# Patient Record
Sex: MALE | Race: WHITE | NOT HISPANIC OR LATINO | URBAN - METROPOLITAN AREA
[De-identification: names, ages, dates, MRNs, and addresses within clinical notes are randomized per-mention and may not be internally consistent; named-entity substitution may affect disease eponyms.]

---

## 2017-03-21 ENCOUNTER — OFFICE (OUTPATIENT)
Dept: URBAN - METROPOLITAN AREA CLINIC 14 | Facility: CLINIC | Age: 82
End: 2017-03-21
Payer: COMMERCIAL

## 2017-03-21 VITALS
DIASTOLIC BLOOD PRESSURE: 68 MMHG | HEART RATE: 60 BPM | HEIGHT: 68 IN | WEIGHT: 160 LBS | SYSTOLIC BLOOD PRESSURE: 138 MMHG

## 2017-03-21 DIAGNOSIS — K59.00 CONSTIPATION, UNSPECIFIED: ICD-10-CM

## 2017-03-21 DIAGNOSIS — R14.0 ABDOMINAL DISTENSION (GASEOUS): ICD-10-CM

## 2017-03-21 DIAGNOSIS — K64.8 OTHER HEMORRHOIDS: ICD-10-CM

## 2017-03-21 PROCEDURE — 99213 OFFICE O/P EST LOW 20 MIN: CPT | Performed by: INTERNAL MEDICINE

## 2017-03-21 PROCEDURE — G8427 DOCREV CUR MEDS BY ELIG CLIN: HCPCS | Performed by: INTERNAL MEDICINE

## 2017-03-21 RX ORDER — HYDROCORTISONE ACETATE 25 MG/1
SUPPOSITORY RECTAL
Qty: 20 | Refills: 3 | Status: ACTIVE
Start: 2016-02-01

## 2018-07-09 ENCOUNTER — OFFICE (OUTPATIENT)
Dept: URBAN - METROPOLITAN AREA CLINIC 14 | Facility: CLINIC | Age: 83
End: 2018-07-09
Payer: COMMERCIAL

## 2018-07-09 VITALS
DIASTOLIC BLOOD PRESSURE: 71 MMHG | HEIGHT: 68 IN | WEIGHT: 160 LBS | HEART RATE: 80 BPM | SYSTOLIC BLOOD PRESSURE: 147 MMHG

## 2018-07-09 DIAGNOSIS — K59.00 CONSTIPATION, UNSPECIFIED: ICD-10-CM

## 2018-07-09 DIAGNOSIS — R14.0 ABDOMINAL DISTENSION (GASEOUS): ICD-10-CM

## 2018-07-09 DIAGNOSIS — K64.8 OTHER HEMORRHOIDS: ICD-10-CM

## 2018-07-09 PROCEDURE — 99213 OFFICE O/P EST LOW 20 MIN: CPT | Performed by: INTERNAL MEDICINE

## 2018-07-09 RX ORDER — CIPROFLOXACIN 250 MG/1
TABLET, FILM COATED ORAL
Qty: 20 | Refills: 0 | Status: COMPLETED
Start: 2018-07-09 | End: 2018-08-30

## 2018-07-09 RX ORDER — HYDROCORTISONE ACETATE 25 MG/1
SUPPOSITORY RECTAL
Qty: 20 | Refills: 3 | Status: ACTIVE
Start: 2016-02-01

## 2018-08-30 ENCOUNTER — OFFICE (OUTPATIENT)
Dept: URBAN - METROPOLITAN AREA CLINIC 14 | Facility: CLINIC | Age: 83
End: 2018-08-30
Payer: COMMERCIAL

## 2018-08-30 VITALS
DIASTOLIC BLOOD PRESSURE: 68 MMHG | RESPIRATION RATE: 16 BRPM | WEIGHT: 158 LBS | SYSTOLIC BLOOD PRESSURE: 131 MMHG | HEART RATE: 68 BPM | HEIGHT: 68 IN

## 2018-08-30 DIAGNOSIS — R14.0 ABDOMINAL DISTENSION (GASEOUS): ICD-10-CM

## 2018-08-30 DIAGNOSIS — I10 ESSENTIAL (PRIMARY) HYPERTENSION: ICD-10-CM

## 2018-08-30 DIAGNOSIS — R10.84 GENERALIZED ABDOMINAL PAIN: ICD-10-CM

## 2018-08-30 DIAGNOSIS — K59.00 CONSTIPATION, UNSPECIFIED: ICD-10-CM

## 2018-08-30 PROCEDURE — 99214 OFFICE O/P EST MOD 30 MIN: CPT | Performed by: NURSE PRACTITIONER

## 2018-08-30 RX ORDER — ESOMEPRAZOLE MAGNESIUM 20 MG/1
22.3 CAPSULE, DELAYED RELEASE ORAL
Qty: 30 | Refills: 0 | Status: ACTIVE
Start: 2018-08-30

## 2018-08-30 NOTE — SERVICEHPINOTES
Efrem Almendarez   is a   89   year old  male   here todayWith complaints of abdominal pain.  The patient notes for the last 3 days he has had significant generalized abdominal pain whenever he eats any solid food.  He has been able to tolerate mashed potatoes and scrambled eggs but does experience some pain with the eggs.  He is not aware of anything that may have triggered his symptoms and denies any associated nausea, vomiting or fever.  He did have similar issues many years ago and this was managed with a brief alteration in his diet.  He complains of significant gaseousness but is having very regular stools with taking about a 3rd dose of MiraLax per day.  He has at least 2-3 stools per day and denies any melena or hematochezia.  His  previous bloating symptom improved some with taking a course of Cipro.

## 2018-10-24 ENCOUNTER — TELEPHONE (OUTPATIENT)
Dept: FAMILY MEDICINE | Facility: CLINIC | Age: 83
End: 2018-10-24

## 2018-10-24 NOTE — TELEPHONE ENCOUNTER
Pt daughter in law wanted pt medical records faxed over to us from outside source. ANA will be faxed over to other sources during pt visit.

## 2018-10-24 NOTE — TELEPHONE ENCOUNTER
----- Message from Heaven Mayo sent at 10/24/2018  2:35 PM CDT -----  Contact: Patients daughter in law, Adam Medina needs to speak to nurse regarding getting patients medical record, she already spoke to medical records and they told her to speak to nurse, please call her back at [phone]. Thank you

## 2018-10-29 ENCOUNTER — TELEPHONE (OUTPATIENT)
Dept: FAMILY MEDICINE | Facility: CLINIC | Age: 83
End: 2018-10-29

## 2018-10-29 NOTE — TELEPHONE ENCOUNTER
----- Message from Shannon Miranda sent at 10/29/2018 12:13 PM CDT -----  Contact: pt daughter - Ms Gallagher  Pt stated she calling about a request for pt medical records, she can be reached at [phone] Thanks

## 2018-11-19 ENCOUNTER — OFFICE VISIT (OUTPATIENT)
Dept: FAMILY MEDICINE | Facility: CLINIC | Age: 83
End: 2018-11-19
Payer: MEDICARE

## 2018-11-19 ENCOUNTER — LAB VISIT (OUTPATIENT)
Dept: LAB | Facility: HOSPITAL | Age: 83
End: 2018-11-19
Payer: MEDICARE

## 2018-11-19 VITALS
DIASTOLIC BLOOD PRESSURE: 70 MMHG | HEART RATE: 82 BPM | OXYGEN SATURATION: 97 % | HEIGHT: 69 IN | WEIGHT: 156.63 LBS | BODY MASS INDEX: 23.2 KG/M2 | SYSTOLIC BLOOD PRESSURE: 128 MMHG | RESPIRATION RATE: 16 BRPM | TEMPERATURE: 98 F

## 2018-11-19 DIAGNOSIS — F41.9 ANXIETY: ICD-10-CM

## 2018-11-19 DIAGNOSIS — F32.A DEPRESSION, UNSPECIFIED DEPRESSION TYPE: ICD-10-CM

## 2018-11-19 DIAGNOSIS — I10 ESSENTIAL HYPERTENSION: ICD-10-CM

## 2018-11-19 DIAGNOSIS — I25.10 CORONARY ARTERY DISEASE, ANGINA PRESENCE UNSPECIFIED, UNSPECIFIED VESSEL OR LESION TYPE, UNSPECIFIED WHETHER NATIVE OR TRANSPLANTED HEART: ICD-10-CM

## 2018-11-19 DIAGNOSIS — Z95.1 HX OF CABG: ICD-10-CM

## 2018-11-19 DIAGNOSIS — E78.5 DYSLIPIDEMIA: ICD-10-CM

## 2018-11-19 DIAGNOSIS — N40.0 BENIGN PROSTATIC HYPERPLASIA WITHOUT LOWER URINARY TRACT SYMPTOMS: ICD-10-CM

## 2018-11-19 DIAGNOSIS — R25.1 TREMOR: ICD-10-CM

## 2018-11-19 LAB
ALBUMIN SERPL BCP-MCNC: 3.4 G/DL
ALP SERPL-CCNC: 60 U/L
ALT SERPL W/O P-5'-P-CCNC: 13 U/L
ANION GAP SERPL CALC-SCNC: 6 MMOL/L
AST SERPL-CCNC: 20 U/L
BASOPHILS # BLD AUTO: 0.04 K/UL
BASOPHILS NFR BLD: 0.7 %
BILIRUB SERPL-MCNC: 0.5 MG/DL
BUN SERPL-MCNC: 17 MG/DL
CALCIUM SERPL-MCNC: 10.3 MG/DL
CHLORIDE SERPL-SCNC: 104 MMOL/L
CHOLEST SERPL-MCNC: 181 MG/DL
CHOLEST/HDLC SERPL: 4.8 {RATIO}
CO2 SERPL-SCNC: 29 MMOL/L
CREAT SERPL-MCNC: 0.8 MG/DL
DIFFERENTIAL METHOD: ABNORMAL
EOSINOPHIL # BLD AUTO: 0.3 K/UL
EOSINOPHIL NFR BLD: 4.5 %
ERYTHROCYTE [DISTWIDTH] IN BLOOD BY AUTOMATED COUNT: 12.6 %
EST. GFR  (AFRICAN AMERICAN): >60 ML/MIN/1.73 M^2
EST. GFR  (NON AFRICAN AMERICAN): >60 ML/MIN/1.73 M^2
GLUCOSE SERPL-MCNC: 87 MG/DL
HCT VFR BLD AUTO: 38.9 %
HDLC SERPL-MCNC: 38 MG/DL
HDLC SERPL: 21 %
HGB BLD-MCNC: 12.6 G/DL
IMM GRANULOCYTES # BLD AUTO: 0.01 K/UL
IMM GRANULOCYTES NFR BLD AUTO: 0.2 %
LDLC SERPL CALC-MCNC: 110.6 MG/DL
LYMPHOCYTES # BLD AUTO: 1.6 K/UL
LYMPHOCYTES NFR BLD: 28.2 %
MCH RBC QN AUTO: 33.9 PG
MCHC RBC AUTO-ENTMCNC: 32.4 G/DL
MCV RBC AUTO: 105 FL
MONOCYTES # BLD AUTO: 0.6 K/UL
MONOCYTES NFR BLD: 10.9 %
NEUTROPHILS # BLD AUTO: 3.2 K/UL
NEUTROPHILS NFR BLD: 55.5 %
NONHDLC SERPL-MCNC: 143 MG/DL
NRBC BLD-RTO: 0 /100 WBC
PLATELET # BLD AUTO: 138 K/UL
PMV BLD AUTO: 10.5 FL
POTASSIUM SERPL-SCNC: 4.2 MMOL/L
PROT SERPL-MCNC: 5.6 G/DL
RBC # BLD AUTO: 3.72 M/UL
SODIUM SERPL-SCNC: 139 MMOL/L
TRIGL SERPL-MCNC: 162 MG/DL
WBC # BLD AUTO: 5.77 K/UL

## 2018-11-19 PROCEDURE — 85025 COMPLETE CBC W/AUTO DIFF WBC: CPT

## 2018-11-19 PROCEDURE — 99205 OFFICE O/P NEW HI 60 MIN: CPT | Mod: S$PBB,,, | Performed by: INTERNAL MEDICINE

## 2018-11-19 PROCEDURE — 80061 LIPID PANEL: CPT

## 2018-11-19 PROCEDURE — 36415 COLL VENOUS BLD VENIPUNCTURE: CPT | Mod: PO

## 2018-11-19 PROCEDURE — 99999 PR PBB SHADOW E&M-EST. PATIENT-LVL IV: CPT | Mod: PBBFAC,,, | Performed by: INTERNAL MEDICINE

## 2018-11-19 PROCEDURE — 80053 COMPREHEN METABOLIC PANEL: CPT

## 2018-11-19 PROCEDURE — 99214 OFFICE O/P EST MOD 30 MIN: CPT | Mod: PBBFAC,PO | Performed by: INTERNAL MEDICINE

## 2018-11-19 RX ORDER — ALPRAZOLAM 0.5 MG/1
0.5 TABLET ORAL DAILY
COMMUNITY
End: 2019-05-24

## 2018-11-19 RX ORDER — TAMSULOSIN HYDROCHLORIDE 0.4 MG/1
0.4 CAPSULE ORAL DAILY
COMMUNITY

## 2018-11-19 RX ORDER — POLYETHYLENE GLYCOL 3350 17 G/17G
17 POWDER, FOR SOLUTION ORAL DAILY
COMMUNITY

## 2018-11-19 RX ORDER — FINASTERIDE 5 MG/1
5 TABLET, FILM COATED ORAL DAILY
COMMUNITY
End: 2018-11-28 | Stop reason: SDUPTHER

## 2018-11-19 RX ORDER — ASCORBIC ACID 125 MG
TABLET,CHEWABLE ORAL
COMMUNITY

## 2018-11-19 RX ORDER — FLUTICASONE PROPIONATE 50 MCG
2 SPRAY, SUSPENSION (ML) NASAL DAILY
COMMUNITY

## 2018-11-19 RX ORDER — PRAVASTATIN SODIUM 40 MG/1
40 TABLET ORAL DAILY
Qty: 90 TABLET | Refills: 3 | Status: SHIPPED | OUTPATIENT
Start: 2018-11-19 | End: 2019-11-19

## 2018-11-19 RX ORDER — PAROXETINE 10 MG/1
10 TABLET, FILM COATED ORAL EVERY MORNING
Qty: 90 TABLET | Refills: 3 | Status: SHIPPED | OUTPATIENT
Start: 2018-11-19 | End: 2019-11-19

## 2018-11-19 NOTE — PROGRESS NOTES
Subjective:       Patient ID: Fei Vicente is a 89 y.o. male.    Chief Complaint: Establish Care; Benign Prostatic Hypertrophy; Coronary Artery Disease; Anxiety; Hypertension; and Hyperlipidemia    --est care------------      Benign Prostatic Hypertrophy   This is a chronic problem. Irritative symptoms do not include frequency or urgency. Pertinent negatives include no chills, dysuria, hematuria, nausea or vomiting.   Coronary Artery Disease   Presents for follow-up visit. Pertinent negatives include no chest pain, chest tightness, dizziness, leg swelling, palpitations or shortness of breath. Risk factors include hyperlipidemia. The symptoms have been stable.   Anxiety   Patient reports no chest pain, confusion, decreased concentration, dizziness, nausea, nervous/anxious behavior, palpitations, shortness of breath or suicidal ideas.     His past medical history is significant for CAD.   Hypertension   The problem is controlled. Associated symptoms include anxiety. Pertinent negatives include no chest pain, headaches, neck pain, palpitations or shortness of breath.   Hyperlipidemia   Associated symptoms include myalgias. Pertinent negatives include no chest pain or shortness of breath. Current antihyperlipidemic treatment includes statins.     No past medical history on file.  No past surgical history on file.  No family history on file.  Social History     Socioeconomic History    Marital status: Single     Spouse name: Not on file    Number of children: Not on file    Years of education: Not on file    Highest education level: Not on file   Social Needs    Financial resource strain: Not on file    Food insecurity - worry: Not on file    Food insecurity - inability: Not on file    Transportation needs - medical: Not on file    Transportation needs - non-medical: Not on file   Occupational History    Not on file   Tobacco Use    Smoking status: Never Smoker    Smokeless tobacco: Never Used   Substance and  Sexual Activity    Alcohol use: Not on file    Drug use: Not on file    Sexual activity: Not on file   Other Topics Concern    Not on file   Social History Narrative    Not on file     Review of Systems   Constitutional: Negative for activity change, appetite change, chills, diaphoresis, fatigue, fever and unexpected weight change.   HENT: Negative for drooling, ear discharge, ear pain, facial swelling, hearing loss, mouth sores, nosebleeds, postnasal drip, rhinorrhea, sinus pressure, sneezing, sore throat, tinnitus, trouble swallowing and voice change.    Eyes: Positive for visual disturbance. Negative for photophobia and redness.   Respiratory: Negative for apnea, cough, choking, chest tightness, shortness of breath and wheezing.    Cardiovascular: Negative for chest pain, palpitations and leg swelling.   Gastrointestinal: Negative for abdominal distention, abdominal pain, anal bleeding, blood in stool, constipation, diarrhea, nausea and vomiting.   Endocrine: Negative for cold intolerance, heat intolerance, polydipsia, polyphagia and polyuria.   Genitourinary: Negative for difficulty urinating, dysuria, enuresis, flank pain, frequency, genital sores, hematuria and urgency.   Musculoskeletal: Positive for arthralgias, back pain and myalgias. Negative for gait problem, joint swelling, neck pain and neck stiffness.   Skin: Negative for color change, pallor, rash and wound.   Allergic/Immunologic: Negative for food allergies and immunocompromised state.   Neurological: Positive for tremors. Negative for dizziness, seizures, syncope, facial asymmetry, speech difficulty, weakness, light-headedness, numbness and headaches.   Hematological: Negative for adenopathy. Does not bruise/bleed easily.   Psychiatric/Behavioral: Negative for agitation, behavioral problems, confusion, decreased concentration, dysphoric mood, hallucinations, self-injury, sleep disturbance and suicidal ideas. The patient is not nervous/anxious  and is not hyperactive.        Objective:      Physical Exam   Constitutional: He is oriented to person, place, and time. He appears well-developed and well-nourished. No distress.   HENT:   Head: Normocephalic and atraumatic.   Eyes: Pupils are equal, round, and reactive to light.   Neck: Normal range of motion. Neck supple. No JVD present. Carotid bruit is not present. No tracheal deviation present. No thyromegaly present.   Cardiovascular: Normal rate, regular rhythm, normal heart sounds and intact distal pulses.   Pulmonary/Chest: Effort normal and breath sounds normal. No respiratory distress. He has no wheezes. He has no rales. He exhibits no tenderness.   Abdominal: Soft. Bowel sounds are normal. He exhibits no distension. There is no tenderness. There is no rebound and no guarding.   Musculoskeletal: Normal range of motion. He exhibits no edema or tenderness.   Lymphadenopathy:     He has no cervical adenopathy.   Neurological: He is alert and oriented to person, place, and time.   Skin: Skin is warm and dry. No rash noted. He is not diaphoretic. No erythema. No pallor.   Psychiatric: He has a normal mood and affect. His behavior is normal. Judgment and thought content normal.   Nursing note and vitals reviewed.      CMP  No results found for: NA, K, CL, CO2, GLU, BUN, CREATININE, CALCIUM, PROT, ALBUMIN, BILITOT, ALKPHOS, AST, ALT, ANIONGAP, ESTGFRAFRICA, EGFRNONAA  No results found for: WBC, HGB, HCT, MCV, PLT  No results found for: CHOL  No results found for: HDL  No results found for: LDLCALC  No results found for: TRIG  No results found for: CHOLHDL  No results found for: TSH  No results found for: LABA1C, HGBA1C  Assessment:       1. Coronary artery disease, angina presence unspecified, unspecified vessel or lesion type, unspecified whether native or transplanted heart    2. Hx of CABG    3. Dyslipidemia    4. Tremor    5. Anxiety    6. Benign prostatic hyperplasia without lower urinary tract symptoms     7. Depression, unspecified depression type    8. Essential hypertension        Plan:   Coronary artery disease, angina presence unspecified, unspecified vessel or lesion type, unspecified whether native or transplanted heart-stable.    Hx of CABG    Dyslipidemia  -     Lipid panel; Future; Expected date: 11/19/2018  -     pravastatin (PRAVACHOL) 40 MG tablet; Take 1 tablet (40 mg total) by mouth once daily.  Dispense: 90 tablet; Refill: 3    Tremor-stable./    Anxiety  -     paroxetine (PAXIL) 10 MG tablet; Take 1 tablet (10 mg total) by mouth every morning.  Dispense: 90 tablet; Refill: 3    Benign prostatic hyperplasia without lower urinary tract symptoms-stable.    Depression, unspecified depression type  -     paroxetine (PAXIL) 10 MG tablet; Take 1 tablet (10 mg total) by mouth every morning.  Dispense: 90 tablet; Refill: 3    Essential hypertension  -     Comprehensive metabolic panel; Future; Expected date: 11/19/2018  -     CBC auto differential; Future; Expected date: 11/19/2018    Other orders  -     Cancel: Lipid panel; Future; Expected date: 11/05/2018    F/u 6 months.

## 2018-11-20 ENCOUNTER — OFFICE VISIT (OUTPATIENT)
Dept: OPHTHALMOLOGY | Facility: CLINIC | Age: 83
End: 2018-11-20
Payer: MEDICARE

## 2018-11-20 ENCOUNTER — TELEPHONE (OUTPATIENT)
Dept: FAMILY MEDICINE | Facility: CLINIC | Age: 83
End: 2018-11-20

## 2018-11-20 DIAGNOSIS — H35.3231 EXUDATIVE AGE-RELATED MACULAR DEGENERATION OF BOTH EYES WITH ACTIVE CHOROIDAL NEOVASCULARIZATION: Primary | ICD-10-CM

## 2018-11-20 DIAGNOSIS — D64.9 ANEMIA, UNSPECIFIED TYPE: Primary | ICD-10-CM

## 2018-11-20 PROCEDURE — 92134 CPTRZ OPH DX IMG PST SGM RTA: CPT | Mod: PBBFAC,PO | Performed by: OPHTHALMOLOGY

## 2018-11-20 PROCEDURE — 99999 PR PBB SHADOW E&M-EST. PATIENT-LVL II: CPT | Mod: PBBFAC,,, | Performed by: OPHTHALMOLOGY

## 2018-11-20 PROCEDURE — 92004 COMPRE OPH EXAM NEW PT 1/>: CPT | Mod: S$PBB,,, | Performed by: OPHTHALMOLOGY

## 2018-11-20 PROCEDURE — 99212 OFFICE O/P EST SF 10 MIN: CPT | Mod: PBBFAC,PO | Performed by: OPHTHALMOLOGY

## 2018-11-20 NOTE — PROGRESS NOTES
l    ===============================  11/20/2018   Fei Vicente,   89 y.o. male   Last visit [facility]: :Visit date not found   Last visit eye dept. Visit date not found  [facility]:  Corrected distance visual acuity was 20/50 in the right eye and 20/400 in the left eye.  Tonometry     Tonometry (Applanation, 10:46 AM)       Right Left    Pressure 16 11              Wearing Rx     Wearing Rx       Sphere Cylinder Axis Add    Right +0.75 +0.50 175 +3.00    Left +1.00 +0.75 175 +3.00    Type:  PAL               Not recorded        Chief Complaint   Patient presents with    Macular Degeneration     Pt states that he just moved here from [place]/ needs to establish care with retina doctor        HPI     Macular Degeneration      Additional comments: Pt states that he just moved here from [place]/   needs to establish care with retina doctor              Comments     No dm  PCIOL OU  INJECTIONS  OU (DR. MEGAN CHILD)  [facility]          Last edited by Claudia Chin on 11/20/2018 10:36 AM. (History)          ________________  11/20/2018  Problem List Items Addressed This Visit     None        Last inj od   Had been ou  txx  presumtive avstin  20 50  400  Inc cd os >od  Get cct and rnfl next visit  eyela  Ou  6 week interval - prn  rec contiune eylea ou      .       ===============================

## 2018-11-28 RX ORDER — FINASTERIDE 5 MG/1
5 TABLET, FILM COATED ORAL DAILY
Qty: 90 TABLET | Refills: 3 | Status: SHIPPED | OUTPATIENT
Start: 2018-11-28

## 2018-11-28 NOTE — TELEPHONE ENCOUNTER
Pt daughter states during pt last visit you stated once the pt medications are out you will be able to refill his medications. Pt last appt with you was to est care.

## 2018-11-28 NOTE — TELEPHONE ENCOUNTER
Steffi, pt daughter in law was informed she will have to resign the consent form with pt and pt son.

## 2018-11-30 ENCOUNTER — PROCEDURE VISIT (OUTPATIENT)
Dept: OPHTHALMOLOGY | Facility: CLINIC | Age: 83
End: 2018-11-30
Payer: MEDICARE

## 2018-11-30 DIAGNOSIS — H35.3231 EXUDATIVE AGE-RELATED MACULAR DEGENERATION OF BOTH EYES WITH ACTIVE CHOROIDAL NEOVASCULARIZATION: Primary | ICD-10-CM

## 2018-11-30 PROCEDURE — 99499 UNLISTED E&M SERVICE: CPT | Mod: S$PBB,,, | Performed by: OPHTHALMOLOGY

## 2018-11-30 PROCEDURE — 67028 INJECTION EYE DRUG: CPT | Mod: PBBFAC,PO,RT,LT | Performed by: OPHTHALMOLOGY

## 2018-11-30 PROCEDURE — 92134 CPTRZ OPH DX IMG PST SGM RTA: CPT | Mod: PBBFAC,PO | Performed by: OPHTHALMOLOGY

## 2018-11-30 PROCEDURE — 67028 INJECTION EYE DRUG: CPT | Mod: S$PBB,50,, | Performed by: OPHTHALMOLOGY

## 2018-11-30 RX ORDER — POLYMYXIN B SULFATE AND TRIMETHOPRIM 1; 10000 MG/ML; [USP'U]/ML
1 SOLUTION OPHTHALMIC 4 TIMES DAILY
Qty: 10 ML | Refills: 0 | Status: SHIPPED | OUTPATIENT
Start: 2018-11-30 | End: 2018-12-30

## 2018-11-30 RX ADMIN — AFLIBERCEPT 2 MG: 40 INJECTION, SOLUTION INTRAVITREAL at 10:11

## 2018-11-30 NOTE — PROGRESS NOTES
===============================  11/30/2018   Fei Vicente Sr.,   89 y.o. male   Last visit [facility]: :11/20/2018   Last visit [facility] dept. 11/20/2018  [facility]:  Corrected distance visual acuity was 20/50 in the right eye and 20/400 in the left eye.   Not recorded         Not recorded         Not recorded        No chief complaint on file.             ________________  11/30/2018  Problem List Items Addressed This Visit        Eye/Vision problems    Exudative age-related macular degeneration of both eyes with active choroidal neovascularization - Primary    Relevant Medications    aflibercept Soln 2 mg (Completed)    aflibercept Soln 2 mg (Completed)    Other Relevant Orders    Posterior Segment OCT Retina-Both eyes (Completed)    Prior Authorization Order        Prev  avstin ijnetions ou  lpnp term   Today trial of eyel ou   rtc 1 mo      11/30/2018  Diagnosis :  Ou srn  Today:   Eylea (afibercept) 2 mg/0.05 ml Intravitreal Injection , OU   Follow up: rtc 1 mo  Do          Instructed to call 24/7 for any worsening of vision. Check Both eyes daily. Gave patient my home phone number.      Procedure  Note:   OU}  Eylea (afibercept) 2 mg/0.05 ml Intravitreal Injection    I have explained the Risks, Benefits and Alternatives of the procedure in detail.  The patient voices understanding and all questions have been answered.  The patient agrees to proceed as discussed.  Xylocaine with Epi 2%  subconj bleb  was used for anesthesia.  Topical betadine was used for antisepsis.  0.05 cc was  injected 3.7 mm from corneal limbus in the inferotemporal quadrant.  Following injection the IOP was less than thirty (<30) by tonopen.  The eye was then thoroughly irrigated with BSS.  Patient tolerated procedure well.  No complications were observed.  The Patient was educated that mild irritation tonight was normal secondary to topical antispsis use.  Pt was advised to call at any time day or night for pain, redness, or any decline in vision. I  gave the patient my home number as well as the clinic on call number. Daily visual checks and Amsler grid testing were reviewed.  polytrim Antibiotic Drops to be used 4 times daily for 4 days  PAUL Delgadillo MD  Procedure ordered: y  Consent: y  Pre auth: y  MAR:y  Opnote: y  Charge capture:y           ===========================

## 2018-12-18 ENCOUNTER — PATIENT MESSAGE (OUTPATIENT)
Dept: FAMILY MEDICINE | Facility: CLINIC | Age: 83
End: 2018-12-18

## 2018-12-18 ENCOUNTER — LAB VISIT (OUTPATIENT)
Dept: LAB | Facility: HOSPITAL | Age: 83
End: 2018-12-18
Attending: INTERNAL MEDICINE
Payer: MEDICARE

## 2018-12-18 ENCOUNTER — TELEPHONE (OUTPATIENT)
Dept: FAMILY MEDICINE | Facility: CLINIC | Age: 83
End: 2018-12-18

## 2018-12-18 DIAGNOSIS — D64.9 ANEMIA, UNSPECIFIED TYPE: Primary | ICD-10-CM

## 2018-12-18 DIAGNOSIS — D64.9 ANEMIA, UNSPECIFIED TYPE: ICD-10-CM

## 2018-12-18 LAB
BASOPHILS # BLD AUTO: 0.02 K/UL
BASOPHILS NFR BLD: 0.3 %
DIFFERENTIAL METHOD: ABNORMAL
EOSINOPHIL # BLD AUTO: 0.3 K/UL
EOSINOPHIL NFR BLD: 4 %
ERYTHROCYTE [DISTWIDTH] IN BLOOD BY AUTOMATED COUNT: 12.4 %
HCT VFR BLD AUTO: 36 %
HGB BLD-MCNC: 11.9 G/DL
IMM GRANULOCYTES # BLD AUTO: 0.01 K/UL
IMM GRANULOCYTES NFR BLD AUTO: 0.2 %
LYMPHOCYTES # BLD AUTO: 1.5 K/UL
LYMPHOCYTES NFR BLD: 24.5 %
MCH RBC QN AUTO: 33.4 PG
MCHC RBC AUTO-ENTMCNC: 33.1 G/DL
MCV RBC AUTO: 101 FL
MONOCYTES # BLD AUTO: 0.7 K/UL
MONOCYTES NFR BLD: 11.9 %
NEUTROPHILS # BLD AUTO: 3.7 K/UL
NEUTROPHILS NFR BLD: 59.1 %
NRBC BLD-RTO: 0 /100 WBC
PLATELET # BLD AUTO: 124 K/UL
PMV BLD AUTO: 10.4 FL
RBC # BLD AUTO: 3.56 M/UL
WBC # BLD AUTO: 6.21 K/UL

## 2018-12-18 PROCEDURE — 85025 COMPLETE CBC W/AUTO DIFF WBC: CPT

## 2018-12-18 PROCEDURE — 36415 COLL VENOUS BLD VENIPUNCTURE: CPT | Mod: PO

## 2018-12-18 RX ORDER — PRIMIDONE 50 MG/1
50 TABLET ORAL DAILY
Qty: 30 TABLET | Refills: 2 | Status: SHIPPED | OUTPATIENT
Start: 2018-12-18 | End: 2019-12-18

## 2018-12-18 RX ORDER — CIPROFLOXACIN 250 MG/1
250 TABLET, FILM COATED ORAL 2 TIMES DAILY
Qty: 14 TABLET | Refills: 0 | Status: SHIPPED | OUTPATIENT
Start: 2018-12-18 | End: 2018-12-28

## 2018-12-18 NOTE — TELEPHONE ENCOUNTER
cipro and primidone sent to pharmacy.      Cbc today showed anemia and I ordered hematology consult-----see phone note.

## 2018-12-20 ENCOUNTER — PATIENT MESSAGE (OUTPATIENT)
Dept: FAMILY MEDICINE | Facility: CLINIC | Age: 83
End: 2018-12-20

## 2018-12-20 DIAGNOSIS — R25.1 TREMOR: Primary | ICD-10-CM

## 2019-01-02 ENCOUNTER — CLINICAL SUPPORT (OUTPATIENT)
Dept: AUDIOLOGY | Facility: CLINIC | Age: 84
End: 2019-01-02
Payer: MEDICARE

## 2019-01-02 DIAGNOSIS — H90.5 HEARING LOSS, SENSORINEURAL, COMBINED TYPES: Primary | ICD-10-CM

## 2019-01-02 PROCEDURE — 92567 TYMPANOMETRY: CPT | Mod: PBBFAC,PO | Performed by: AUDIOLOGIST

## 2019-01-02 PROCEDURE — 92557 COMPREHENSIVE HEARING TEST: CPT | Mod: PBBFAC,PO | Performed by: AUDIOLOGIST

## 2019-01-02 NOTE — PROGRESS NOTES
Fei Jules Goodman was seen 01/02/2019 for an audiological evaluation.  Patient complains of bilateral gradual hearing loss. He wears CICs purchased 8-10 years ago in [place]. He recently moved here to be closer to family.     Results reveal a mild-to-severe sensorineural hearing loss 250-8000 Hz for the right ear, and  mild-to-profound sensorineural hearing loss 250-8000 Hz for the left ear.   Speech Reception Thresholds were  30 dBHL for the right ear and 30 dBHL for the left ear.   Word recognition scores were good for the right ear and good for the left ear.   Tympanograms were Type A, normal for the right ear and Type A, normal for the left ear.    Patient was counseled on the above findings.    Recommendations include:    1.  ENT follow-up as needed  2.  Hearing aid consult (information was given to patient at today's visit)  3.  Wear hearing protective devices around loud noise  4.  Annual audiograms

## 2019-01-03 ENCOUNTER — PROCEDURE VISIT (OUTPATIENT)
Dept: OPHTHALMOLOGY | Facility: CLINIC | Age: 84
End: 2019-01-03
Payer: MEDICARE

## 2019-01-03 DIAGNOSIS — H35.3231 EXUDATIVE AGE-RELATED MACULAR DEGENERATION OF BOTH EYES WITH ACTIVE CHOROIDAL NEOVASCULARIZATION: Primary | ICD-10-CM

## 2019-01-03 PROCEDURE — 99499 NO LOS: ICD-10-PCS | Mod: S$PBB,,, | Performed by: OPHTHALMOLOGY

## 2019-01-03 PROCEDURE — 67028 INJECTION EYE DRUG: CPT | Mod: 50,S$PBB,, | Performed by: OPHTHALMOLOGY

## 2019-01-03 PROCEDURE — 99499 UNLISTED E&M SERVICE: CPT | Mod: S$PBB,,, | Performed by: OPHTHALMOLOGY

## 2019-01-03 PROCEDURE — 92134 CPTRZ OPH DX IMG PST SGM RTA: CPT | Mod: PBBFAC,PO | Performed by: OPHTHALMOLOGY

## 2019-01-03 PROCEDURE — 67028 INJECTION EYE DRUG: CPT | Mod: 50,PBBFAC,PO | Performed by: OPHTHALMOLOGY

## 2019-01-03 PROCEDURE — 67028 PR INJECT INTRAVITREAL PHARMCOLOGIC: ICD-10-PCS | Mod: 50,S$PBB,, | Performed by: OPHTHALMOLOGY

## 2019-01-03 PROCEDURE — 92134 POSTERIOR SEGMENT OCT RETINA (OCULAR COHERENCE TOMOGRAPHY)-BOTH EYES: ICD-10-PCS | Mod: 26,S$PBB,, | Performed by: OPHTHALMOLOGY

## 2019-01-03 RX ORDER — POLYMYXIN B SULFATE AND TRIMETHOPRIM 1; 10000 MG/ML; [USP'U]/ML
1 SOLUTION OPHTHALMIC 4 TIMES DAILY
Qty: 1 BOTTLE | Refills: 0 | Status: SHIPPED | OUTPATIENT
Start: 2019-01-03 | End: 2019-01-07

## 2019-01-03 RX ADMIN — AFLIBERCEPT 2 MG: 40 INJECTION, SOLUTION INTRAVITREAL at 11:01

## 2019-01-03 NOTE — PROGRESS NOTES
===============================  01/03/2019   Fei Vicente Sr.,   89 y.o. male   Last visit [facility]: :11/30/2018   Last visit eye dept. 11/30/2018  [facility]:  Corrected distance visual acuity was 20/100 in the right eye and 20/400 in the left eye.   Not recorded         Not recorded         Not recorded        Chief Complaint   Patient presents with    Macular Degeneration     eylea ou        HPI     Macular Degeneration      Additional comments: eylea ou              Comments     Bilateral SRN  Previously treated at [facility] by Dr. Maloney    EYLEA OU 11/30/18          Last edited by Claudia Chin on 1/3/2019 10:24 AM. (History)          ________________  1/3/2019  Problem List Items Addressed This Visit        Eye/Vision problems    Exudative age-related macular degeneration of both eyes with active choroidal neovascularization - Primary    Relevant Medications    polymyxin B sulf-trimethoprim (POLYTRIM) 10,000 unit- 1 mg/mL Drop    aflibercept Soln 2 mg    aflibercept Soln 2 mg    Other Relevant Orders    Prior Authorization Order    Posterior Segment OCT Retina-Both eyes        Prev ou long teram injetcions outside md   last mo first trial of eylea        1/3/2019  Diagnosis :  Or srn  Today:   Eylea (afibercept) 2 mg/0.05 ml Intravitreal Injection , OU   Follow up: rtc  1mo do do as 3/4 trial      Instructed to call 24/7 for any worsening of vision. Check Both eyes daily. Gave patient my home phone number.      Procedure  Note:   OU}  Eylea (afibercept) 2 mg/0.05 ml Intravitreal Injection    I have explained the Risks, Benefits and Alternatives of the procedure in detail.  The patient voices understanding and all questions have been answered.  The patient agrees to proceed as discussed.  Xylocaine with Epi 2% subconj bleb   was used for anesthesia.  Topical betadine was used for antisepsis.  0.05 cc was  injected 3.7 mm from corneal limbus in the inferotemporal quadrant.  Following injection the IOP was  less than thirty (<30) by tonopen.  The eye was then thoroughly irrigated with BSS.  Patient tolerated procedure well.  No complications were observed.  The Patient was educated that mild irritation tonight was normal secondary to topical antispsis use.  Pt was advised to call at any time day or night for pain, redness, or any decline in vision. I gave the patient my home number as well as the clinic on call number. Daily visual checks and Amsler grid testing were reviewed.  polytrim Antibiotic Drops to be used 4 times daily for 4 days  PAUL Delgadillo MD  Procedure ordered: y  Consent: y  Pre auth: y  MAR:y  Opnote: y  Charge capture:y      .       ===========================

## 2019-01-03 NOTE — PATIENT INSTRUCTIONS
Post Intravitreal Injection Instructions  Below are some guidelines for what to expect after your treatment and additional care instructions.   You may experience mild discomfort in your eye after injection.  Usually your eye feels better within 24-48 hours after the injection.     You may get a gritty, burning, irritating or stinging feeling in the injected eye as a result of the antiseptic used.  Use artificial tears or eye lubricant to reduce the sensation (systane, refresh, genteal, systane gel, genteal gel, refresh pm).  These are available over the counter from your local pharmacy.  If you already have some at home, be sure to check the expiration date to avoid contaminating your eye.  A cool pack over your eye brow above the injected eye may also relieve discomfort.     You have been given medication to numb the surface of your eye.  DO NOT rub or touch your eye, DO NOT wear contacts until numbness goes away.   You may experience small spots that appear in your field of vision.  These are usually caused by an air bubble or from the medication.  It takes several hours or days for these to go away.   You will have a bright red blood spot on the white part of your eye where the injection/anesthetic was given.  This is normal, and can even cover the entire white part of your eye.  This is just a bruise, and will fade away the same as a bruise; it may take a week or two.   DO NOT swim or put sink water (tap water) in your eyes for at least 4 days after injection. It is ok to wash your face and eyelids with a cloth.  It is ok to shower.   An antibiotic prescription has been given, use 1 drop in the injected eye 4 times daily for 4 days.   CALL with any PAIN!  Office [phone]   After hours call [phone]      Your next appointment is Feb 6 at 8:30 am.  We will be in the new building on the 3rd floor.

## 2019-01-08 ENCOUNTER — INITIAL CONSULT (OUTPATIENT)
Dept: HEMATOLOGY/ONCOLOGY | Facility: CLINIC | Age: 84
End: 2019-01-08
Payer: MEDICARE

## 2019-01-08 ENCOUNTER — LAB VISIT (OUTPATIENT)
Dept: LAB | Facility: HOSPITAL | Age: 84
End: 2019-01-08
Attending: INTERNAL MEDICINE
Payer: MEDICARE

## 2019-01-08 VITALS
HEIGHT: 69 IN | SYSTOLIC BLOOD PRESSURE: 150 MMHG | OXYGEN SATURATION: 96 % | WEIGHT: 160.06 LBS | DIASTOLIC BLOOD PRESSURE: 80 MMHG | RESPIRATION RATE: 18 BRPM | BODY MASS INDEX: 23.71 KG/M2 | HEART RATE: 68 BPM | TEMPERATURE: 98 F

## 2019-01-08 DIAGNOSIS — D53.9 MACROCYTIC ANEMIA: ICD-10-CM

## 2019-01-08 LAB
BASOPHILS # BLD AUTO: 0.02 K/UL
BASOPHILS NFR BLD: 0.3 %
DIFFERENTIAL METHOD: ABNORMAL
EOSINOPHIL # BLD AUTO: 0.3 K/UL
EOSINOPHIL NFR BLD: 4.4 %
ERYTHROCYTE [DISTWIDTH] IN BLOOD BY AUTOMATED COUNT: 12.8 %
FERRITIN SERPL-MCNC: 32 NG/ML
HAPTOGLOB SERPL-MCNC: 71 MG/DL
HCT VFR BLD AUTO: 37.8 %
HGB BLD-MCNC: 12.6 G/DL
IRON SERPL-MCNC: 98 UG/DL
LDH SERPL L TO P-CCNC: 144 U/L
LYMPHOCYTES # BLD AUTO: 1.8 K/UL
LYMPHOCYTES NFR BLD: 27.6 %
MCH RBC QN AUTO: 34 PG
MCHC RBC AUTO-ENTMCNC: 33.3 G/DL
MCV RBC AUTO: 102 FL
MONOCYTES # BLD AUTO: 0.5 K/UL
MONOCYTES NFR BLD: 8.5 %
NEUTROPHILS # BLD AUTO: 3.8 K/UL
NEUTROPHILS NFR BLD: 59.2 %
PLATELET # BLD AUTO: 112 K/UL
PMV BLD AUTO: 9.5 FL
RBC # BLD AUTO: 3.71 M/UL
RETICS/RBC NFR AUTO: 1.3 %
SATURATED IRON: 26 %
TOTAL IRON BINDING CAPACITY: 377 UG/DL
TRANSFERRIN SERPL-MCNC: 255 MG/DL
TSH SERPL DL<=0.005 MIU/L-ACNC: 1.6 UIU/ML
VIT B12 SERPL-MCNC: 1532 PG/ML
WBC # BLD AUTO: 6.37 K/UL

## 2019-01-08 PROCEDURE — 99205 OFFICE O/P NEW HI 60 MIN: CPT | Mod: S$PBB,,, | Performed by: INTERNAL MEDICINE

## 2019-01-08 PROCEDURE — 99999 PR PBB SHADOW E&M-EST. PATIENT-LVL IV: ICD-10-PCS | Mod: PBBFAC,,, | Performed by: INTERNAL MEDICINE

## 2019-01-08 PROCEDURE — 99205 PR OFFICE/OUTPT VISIT, NEW, LEVL V, 60-74 MIN: ICD-10-PCS | Mod: S$PBB,,, | Performed by: INTERNAL MEDICINE

## 2019-01-08 PROCEDURE — 84165 PROTEIN E-PHORESIS SERUM: CPT | Mod: 26,,, | Performed by: PATHOLOGY

## 2019-01-08 PROCEDURE — 85045 AUTOMATED RETICULOCYTE COUNT: CPT

## 2019-01-08 PROCEDURE — 85025 COMPLETE CBC W/AUTO DIFF WBC: CPT | Mod: PO

## 2019-01-08 PROCEDURE — 99214 OFFICE O/P EST MOD 30 MIN: CPT | Mod: PBBFAC,PO | Performed by: INTERNAL MEDICINE

## 2019-01-08 PROCEDURE — 82607 VITAMIN B-12: CPT

## 2019-01-08 PROCEDURE — 84165 PROTEIN E-PHORESIS SERUM: CPT

## 2019-01-08 PROCEDURE — 82728 ASSAY OF FERRITIN: CPT

## 2019-01-08 PROCEDURE — 83615 LACTATE (LD) (LDH) ENZYME: CPT | Mod: PO

## 2019-01-08 PROCEDURE — 99999 PR PBB SHADOW E&M-EST. PATIENT-LVL IV: CPT | Mod: PBBFAC,,, | Performed by: INTERNAL MEDICINE

## 2019-01-08 PROCEDURE — 83540 ASSAY OF IRON: CPT

## 2019-01-08 PROCEDURE — 36415 COLL VENOUS BLD VENIPUNCTURE: CPT | Mod: PO

## 2019-01-08 PROCEDURE — 83520 IMMUNOASSAY QUANT NOS NONAB: CPT | Mod: 59

## 2019-01-08 PROCEDURE — 83010 ASSAY OF HAPTOGLOBIN QUANT: CPT

## 2019-01-08 PROCEDURE — 84443 ASSAY THYROID STIM HORMONE: CPT

## 2019-01-08 PROCEDURE — 84165 PATHOLOGIST INTERPRETATION SPE: ICD-10-PCS | Mod: 26,,, | Performed by: PATHOLOGY

## 2019-01-08 NOTE — PROGRESS NOTES
Subjective:       Patient ID: Fei Vicente Sr. is a 89 y.o. male.    Chief Complaint: Consult; Anemia; and Results    HPI 89-year-old male referred for progressive macrocytic anemia.  Increasing fatigue and weakness especially upon standing    Past Medical History:   Diagnosis Date    Arthritis     Macular degeneration     Retinal detachment      History reviewed. No pertinent family history.  Social History     Socioeconomic History    Marital status: Single     Spouse name: Not on file    Number of children: Not on file    Years of education: Not on file    Highest education level: Not on file   Social Needs    Financial resource strain: Not on file    Food insecurity - worry: Not on file    Food insecurity - inability: Not on file    Transportation needs - medical: Not on file    Transportation needs - non-medical: Not on file   Occupational History    Not on file   Tobacco Use    Smoking status: Never Smoker    Smokeless tobacco: Never Used   Substance and Sexual Activity    Alcohol use: Not on file    Drug use: Not on file    Sexual activity: Not on file   Other Topics Concern    Not on file   Social History Narrative    Not on file     Past Surgical History:   Procedure Laterality Date    CATARACT EXTRACTION      RETINAL DETACHMENT SURGERY         Labs:  Lab Results   Component Value Date    WBC 6.37 01/08/2019    HGB 12.6 (L) 01/08/2019    HCT 37.8 (L) 01/08/2019     (H) 01/08/2019     (L) 01/08/2019     BMP  Lab Results   Component Value Date     11/19/2018    K 4.2 11/19/2018     11/19/2018    CO2 29 11/19/2018    BUN 17 11/19/2018    CREATININE 0.8 11/19/2018    CALCIUM 10.3 11/19/2018    ANIONGAP 6 (L) 11/19/2018    ESTGFRAFRICA >60.0 11/19/2018    EGFRNONAA >60.0 11/19/2018     Lab Results   Component Value Date    ALT 13 11/19/2018    AST 20 11/19/2018    ALKPHOS 60 11/19/2018    BILITOT 0.5 11/19/2018       No results found for: IRON, TIBC, FERRITIN,  SATURATEDIRO  No results found for: [id number]  No results found for: FOLATE  No results found for: TSH      Review of Systems   Constitutional: Positive for activity change, appetite change and fatigue. Negative for chills, diaphoresis, fever and unexpected weight change.   HENT: Negative for congestion, dental problem, drooling, ear discharge, ear pain, facial swelling, hearing loss, mouth sores, nosebleeds, postnasal drip, rhinorrhea, sinus pressure, sneezing, sore throat, tinnitus, trouble swallowing and voice change.    Eyes: Negative for photophobia, pain, discharge, redness, itching and visual disturbance.   Respiratory: Negative for apnea, cough, choking, chest tightness, shortness of breath, wheezing and stridor.    Cardiovascular: Negative for chest pain, palpitations and leg swelling.   Gastrointestinal: Negative for abdominal distention, abdominal pain, anal bleeding, blood in stool, constipation, diarrhea, nausea, rectal pain and vomiting.   Endocrine: Negative for cold intolerance, heat intolerance, polydipsia, polyphagia and polyuria.   Genitourinary: Negative for decreased urine volume, difficulty urinating, discharge, dysuria, enuresis, flank pain, frequency, genital sores, hematuria, penile pain, penile swelling, scrotal swelling, testicular pain and urgency.   Musculoskeletal: Negative for arthralgias, back pain, gait problem, joint swelling, myalgias, neck pain and neck stiffness.   Skin: Negative for color change, pallor, rash and wound.   Allergic/Immunologic: Negative for environmental allergies, food allergies and immunocompromised state.   Neurological: Positive for weakness. Negative for dizziness, tremors, seizures, syncope, facial asymmetry, speech difficulty, light-headedness, numbness and headaches.   Hematological: Negative for adenopathy. Does not bruise/bleed easily.   Psychiatric/Behavioral: Positive for dysphoric mood. Negative for agitation, behavioral problems, confusion,  decreased concentration, hallucinations, self-injury, sleep disturbance and suicidal ideas. The patient is nervous/anxious. The patient is not hyperactive.        Objective:      Physical Exam   Constitutional: He is oriented to person, place, and time. He appears well-developed and well-nourished. He appears distressed.   HENT:   Head: Normocephalic.   Right Ear: External ear normal.   Left Ear: External ear normal.   Ears:    Nose: Nose normal. Right sinus exhibits no maxillary sinus tenderness and no frontal sinus tenderness. Left sinus exhibits no maxillary sinus tenderness and no frontal sinus tenderness.   Mouth/Throat: Oropharynx is clear and moist. No oropharyngeal exudate.   Eyes: EOM and lids are normal. Pupils are equal, round, and reactive to light. Right eye exhibits no discharge. Left eye exhibits no discharge. Right conjunctiva is not injected. Right conjunctiva has no hemorrhage. Left conjunctiva is not injected. Left conjunctiva has no hemorrhage. No scleral icterus. Right eye exhibits normal extraocular motion. Left eye exhibits normal extraocular motion.   Neck: Normal range of motion. Neck supple. No JVD present. No tracheal deviation present. No thyromegaly present.   Cardiovascular: Normal rate, regular rhythm and normal heart sounds.   Pulmonary/Chest: Effort normal and breath sounds normal. No stridor. No respiratory distress.   Abdominal: Soft. Bowel sounds are normal. He exhibits no mass. There is no hepatosplenomegaly, splenomegaly or hepatomegaly. There is no tenderness.   Musculoskeletal: Normal range of motion. He exhibits no edema or tenderness.   Lymphadenopathy:        Head (right side): No posterior auricular and no occipital adenopathy present.        Head (left side): No posterior auricular and no occipital adenopathy present.     He has no cervical adenopathy.        Right cervical: No superficial cervical, no deep cervical and no posterior cervical adenopathy present.       Left  cervical: No superficial cervical, no deep cervical and no posterior cervical adenopathy present.     He has no axillary adenopathy.        Right: No supraclavicular adenopathy present.        Left: No supraclavicular adenopathy present.   Neurological: He is alert and oriented to person, place, and time. He has normal strength. No cranial nerve deficit. Coordination normal.   Skin: Skin is dry. No rash noted. He is not diaphoretic. No cyanosis or erythema. Nails show no clubbing.   Psychiatric: He has a normal mood and affect. His behavior is normal. Judgment and thought content normal. Cognition and memory are normal.   Vitals reviewed.          Assessment:      1. Macrocytic anemia           Plan:   Progressive macrocytic anemia with increasing fatigue and weakness laboratory workup in progress information from up-to-date followed to family.  Will return in 7-10 days with laboratory review communicate patient through portal results if not seen sooner talked briefly about the possibility of myelodysplastic state          Ashok Saravia Jr, MD FACP

## 2019-01-08 NOTE — LETTER
January 8, 2019      Aleksandr Manriquez MD  [address] [place] [address]           [facility]  [address] [place] [address]  Phone: [phone]  Fax: [phone]          Patient: Fei Vicente Sr.   MR Number: [id number]   YOB: 1924   Date of Visit: 1/8/2019       Dear Dr. Aleksandr Manriquez:    Thank you for referring Fei Vicente to me for evaluation. Attached you will find relevant portions of my assessment and plan of care.    If you have questions, please do not hesitate to call me. I look forward to following Fei Vicente along with you.    Sincerely,    Ashok Saravia MD    Enclosure  CC:  No Recipients    If you would like to receive this communication electronically, please contact [email or web address] or [phone] to request more information on [facility] Link access.    For providers and/or their staff who would like to refer a patient to [facility], please contact us through our one-stop-shop provider referral line, [facility], at [phone].    If you feel you have received this communication in error or would no longer like to receive these types of communications, please e-mail [email or web address]

## 2019-01-09 LAB
ALBUMIN SERPL ELPH-MCNC: 3.56 G/DL
ALPHA1 GLOB SERPL ELPH-MCNC: 0.24 G/DL
ALPHA2 GLOB SERPL ELPH-MCNC: 0.59 G/DL
B-GLOBULIN SERPL ELPH-MCNC: 0.57 G/DL
GAMMA GLOB SERPL ELPH-MCNC: 0.44 G/DL
KAPPA LC SER QL IA: 1.47 MG/DL
KAPPA LC/LAMBDA SER IA: 0.03
LAMBDA LC SER QL IA: 45.37 MG/DL
PATHOLOGIST INTERPRETATION SPE: NORMAL
PROT SERPL-MCNC: 5.4 G/DL

## 2019-01-18 ENCOUNTER — TELEPHONE (OUTPATIENT)
Dept: HEMATOLOGY/ONCOLOGY | Facility: CLINIC | Age: 84
End: 2019-01-18

## 2019-01-18 PROBLEM — R76.8 ELEVATED SERUM IMMUNOGLOBULIN FREE LIGHT CHAINS: Status: ACTIVE | Noted: 2019-01-18

## 2019-01-18 PROBLEM — D46.9 MYELODYSPLASTIC SYNDROME, UNSPECIFIED: Status: ACTIVE | Noted: 2019-01-18

## 2019-01-18 NOTE — TELEPHONE ENCOUNTER
----- Message from Marilynn Champagne sent at 1/18/2019 11:47 AM CST -----  Contact: Pt Steffi Gallagher   Pt caregiver is calling regarding requesting to speak with nurse regarding up coming appt. [phone]          .Thank You  Genesis Champagne

## 2019-01-18 NOTE — TELEPHONE ENCOUNTER
Spoke with pts daughter lewis Gallagher. She said the pt had an appt with  today at 2:20pm that someone called this morning from Aprils office and said that he missed his appt for 10am but they did not know he had an appt for 10am and rescheduled him for Monday with April at 10:30am. She said that they want to see . That they do not want to see a NP BC they talked to  and he said he would see the pt. I explained to her that I am sorry. The only think I can think is that his nurse Tamiko tried calling the pt to let him know the NP could see him BC his schedule was full and to try and take some of the load off his schedule. I rescheduled the pt for Monday at 1pm at the .

## 2019-01-21 ENCOUNTER — OFFICE VISIT (OUTPATIENT)
Dept: HEMATOLOGY/ONCOLOGY | Facility: CLINIC | Age: 84
End: 2019-01-21
Payer: MEDICARE

## 2019-01-21 ENCOUNTER — HOSPITAL ENCOUNTER (OUTPATIENT)
Dept: RADIOLOGY | Facility: HOSPITAL | Age: 84
Discharge: HOME | End: 2019-01-21
Attending: INTERNAL MEDICINE
Payer: MEDICARE

## 2019-01-21 VITALS
SYSTOLIC BLOOD PRESSURE: 126 MMHG | WEIGHT: 158.31 LBS | HEART RATE: 61 BPM | HEIGHT: 68 IN | OXYGEN SATURATION: 97 % | BODY MASS INDEX: 23.99 KG/M2 | DIASTOLIC BLOOD PRESSURE: 64 MMHG | TEMPERATURE: 98 F

## 2019-01-21 DIAGNOSIS — D47.2 MGUS (MONOCLONAL GAMMOPATHY OF UNKNOWN SIGNIFICANCE): Primary | ICD-10-CM

## 2019-01-21 DIAGNOSIS — D47.2 MGUS (MONOCLONAL GAMMOPATHY OF UNKNOWN SIGNIFICANCE): ICD-10-CM

## 2019-01-21 PROCEDURE — 77075 RADEX OSSEOUS SURVEY COMPL: CPT | Mod: 26,,, | Performed by: RADIOLOGY

## 2019-01-21 PROCEDURE — 99999 PR PBB SHADOW E&M-EST. PATIENT-LVL III: CPT | Mod: PBBFAC,,, | Performed by: INTERNAL MEDICINE

## 2019-01-21 PROCEDURE — 77075 XR METASTATIC SURVEY: ICD-10-PCS | Mod: 26,,, | Performed by: RADIOLOGY

## 2019-01-21 PROCEDURE — 99213 OFFICE O/P EST LOW 20 MIN: CPT | Mod: PBBFAC | Performed by: INTERNAL MEDICINE

## 2019-01-21 PROCEDURE — 99214 PR OFFICE/OUTPT VISIT, EST, LEVL IV, 30-39 MIN: ICD-10-PCS | Mod: S$PBB,,, | Performed by: INTERNAL MEDICINE

## 2019-01-21 PROCEDURE — 77075 RADEX OSSEOUS SURVEY COMPL: CPT | Mod: TC

## 2019-01-21 PROCEDURE — 99214 OFFICE O/P EST MOD 30 MIN: CPT | Mod: S$PBB,,, | Performed by: INTERNAL MEDICINE

## 2019-01-21 PROCEDURE — 99999 PR PBB SHADOW E&M-EST. PATIENT-LVL III: ICD-10-PCS | Mod: PBBFAC,,, | Performed by: INTERNAL MEDICINE

## 2019-01-21 NOTE — PROGRESS NOTES
Subjective:       Patient ID: Fei Vicente Sr. is a 89 y.o. male.    Chief Complaint: Results and Anemia    HPI 89-year-old male returns for review of progressive anemia and pancytopenia was found to have elevated free light chain ratio returns for review and discussion    Past Medical History:   Diagnosis Date    Arthritis     Macular degeneration     Retinal detachment      History reviewed. No pertinent family history.  Social History     Socioeconomic History    Marital status:      Spouse name: Not on file    Number of children: Not on file    Years of education: Not on file    Highest education level: Not on file   Social Needs    Financial resource strain: Not on file    Food insecurity - worry: Not on file    Food insecurity - inability: Not on file    Transportation needs - medical: Not on file    Transportation needs - non-medical: Not on file   Occupational History    Not on file   Tobacco Use    Smoking status: Never Smoker    Smokeless tobacco: Never Used   Substance and Sexual Activity    Alcohol use: Not on file    Drug use: Not on file    Sexual activity: Not on file   Other Topics Concern    Not on file   Social History Narrative    Not on file     Past Surgical History:   Procedure Laterality Date    CATARACT EXTRACTION      RETINAL DETACHMENT SURGERY         Labs:  Lab Results   Component Value Date    WBC 6.37 01/08/2019    HGB 12.6 (L) 01/08/2019    HCT 37.8 (L) 01/08/2019     (H) 01/08/2019     (L) 01/08/2019     BMP  Lab Results   Component Value Date     11/19/2018    K 4.2 11/19/2018     11/19/2018    CO2 29 11/19/2018    BUN 17 11/19/2018    CREATININE 0.8 11/19/2018    CALCIUM 10.3 11/19/2018    ANIONGAP 6 (L) 11/19/2018    ESTGFRAFRICA >60.0 11/19/2018    EGFRNONAA >60.0 11/19/2018     Lab Results   Component Value Date    ALT 13 11/19/2018    AST 20 11/19/2018    ALKPHOS 60 11/19/2018    BILITOT 0.5 11/19/2018       Lab Results    Component Value Date    IRON 98 01/08/2019    TIBC 377 01/08/2019    FERRITIN 32 01/08/2019     Lab Results   Component Value Date    [id number] 1532 (H) 01/08/2019     No results found for: FOLATE  Lab Results   Component Value Date    TSH 1.602 01/08/2019         Review of Systems   Constitutional: Negative for activity change, appetite change, chills, diaphoresis, fatigue, fever and unexpected weight change.   HENT: Negative for congestion, dental problem, drooling, ear discharge, ear pain, facial swelling, hearing loss, mouth sores, nosebleeds, postnasal drip, rhinorrhea, sinus pressure, sneezing, sore throat, tinnitus, trouble swallowing and voice change.    Eyes: Negative for photophobia, pain, discharge, redness, itching and visual disturbance.   Respiratory: Negative for apnea, cough, choking, chest tightness, shortness of breath, wheezing and stridor.    Cardiovascular: Negative for chest pain, palpitations and leg swelling.   Gastrointestinal: Negative for abdominal distention, abdominal pain, anal bleeding, blood in stool, constipation, diarrhea, nausea, rectal pain and vomiting.   Endocrine: Negative for cold intolerance, heat intolerance, polydipsia, polyphagia and polyuria.   Genitourinary: Negative for decreased urine volume, difficulty urinating, discharge, dysuria, enuresis, flank pain, frequency, genital sores, hematuria, penile pain, penile swelling, scrotal swelling, testicular pain and urgency.   Musculoskeletal: Negative for arthralgias, back pain, gait problem, joint swelling, myalgias, neck pain and neck stiffness.   Skin: Negative for color change, pallor, rash and wound.   Allergic/Immunologic: Negative for environmental allergies, food allergies and immunocompromised state.   Neurological: Positive for weakness. Negative for dizziness, tremors, seizures, syncope, facial asymmetry, speech difficulty, light-headedness, numbness and headaches.   Hematological: Negative for adenopathy. Does  not bruise/bleed easily.   Psychiatric/Behavioral: Positive for dysphoric mood. Negative for agitation, behavioral problems, confusion, decreased concentration, hallucinations, self-injury, sleep disturbance and suicidal ideas. The patient is nervous/anxious. The patient is not hyperactive.        Objective:      Physical Exam   Constitutional: He is oriented to person, place, and time. He appears well-developed and well-nourished. He appears distressed.   HENT:   Head: Normocephalic.   Right Ear: External ear normal.   Left Ear: External ear normal.   Nose: Nose normal. Right sinus exhibits no maxillary sinus tenderness and no frontal sinus tenderness. Left sinus exhibits no maxillary sinus tenderness and no frontal sinus tenderness.   Mouth/Throat: Oropharynx is clear and moist. No oropharyngeal exudate.   Eyes: EOM and lids are normal. Pupils are equal, round, and reactive to light. Right eye exhibits no discharge. Left eye exhibits no discharge. Right conjunctiva is not injected. Right conjunctiva has no hemorrhage. Left conjunctiva is not injected. Left conjunctiva has no hemorrhage. No scleral icterus. Right eye exhibits normal extraocular motion. Left eye exhibits normal extraocular motion.   Neck: Normal range of motion. Neck supple. No JVD present. No tracheal deviation present. No thyromegaly present.   Cardiovascular: Normal rate and regular rhythm.   Pulmonary/Chest: Effort normal. No stridor. No respiratory distress.   Abdominal: Soft. He exhibits no mass. There is no hepatosplenomegaly, splenomegaly or hepatomegaly. There is no tenderness.   Musculoskeletal: Normal range of motion. He exhibits no edema or tenderness.   Lymphadenopathy:        Head (right side): No posterior auricular and no occipital adenopathy present.        Head (left side): No posterior auricular and no occipital adenopathy present.     He has no cervical adenopathy.        Right cervical: No superficial cervical, no deep cervical  and no posterior cervical adenopathy present.       Left cervical: No superficial cervical, no deep cervical and no posterior cervical adenopathy present.     He has no axillary adenopathy.        Right: No supraclavicular adenopathy present.        Left: No supraclavicular adenopathy present.   Neurological: He is alert and oriented to person, place, and time. He has normal strength. No cranial nerve deficit. Coordination normal.   Skin: Skin is dry. No rash noted. He is not diaphoretic. No cyanosis or erythema. Nails show no clubbing.   Psychiatric: He has a normal mood and affect. His behavior is normal. Judgment and thought content normal. Cognition and memory are normal.   Vitals reviewed.          Assessment:      1. MGUS (monoclonal gammopathy of unknown significance)           Plan:     Extensive discussion with he and family present at this point monoclonal gammopathy of undetermined significance information from up-to-date followed to them for their review will of 24 urine protein electrophoresis as well as metastatic skeletal survey bone marrow aspirate and biopsy would need to be done for could confirmation.  I family does not wish to do that see back in 3 months communicate results through electronic patient portal        Ashok Saravia Jr, MD FACP

## 2019-01-24 ENCOUNTER — LAB VISIT (OUTPATIENT)
Dept: LAB | Facility: HOSPITAL | Age: 84
End: 2019-01-24
Attending: INTERNAL MEDICINE
Payer: MEDICARE

## 2019-01-24 DIAGNOSIS — D47.2 MGUS (MONOCLONAL GAMMOPATHY OF UNKNOWN SIGNIFICANCE): ICD-10-CM

## 2019-01-24 LAB
PROT 24H UR-MRATE: 198 MG/SPEC
PROT UR-MCNC: 12 MG/DL
URINE COLLECTION DURATION: 24 HR
URINE VOLUME: 1650 ML

## 2019-01-24 PROCEDURE — 84156 ASSAY OF PROTEIN URINE: CPT

## 2019-01-24 PROCEDURE — 84166 PROTEIN E-PHORESIS/URINE/CSF: CPT

## 2019-01-24 PROCEDURE — 84166 PROTEIN E-PHORESIS/URINE/CSF: CPT | Mod: 26,,, | Performed by: PATHOLOGY

## 2019-01-24 PROCEDURE — 84166 PATHOLOGIST INTERPRETATION UPE: ICD-10-PCS | Mod: 26,,, | Performed by: PATHOLOGY

## 2019-01-28 ENCOUNTER — TELEPHONE (OUTPATIENT)
Dept: HEMATOLOGY/ONCOLOGY | Facility: CLINIC | Age: 84
End: 2019-01-28

## 2019-01-28 ENCOUNTER — PATIENT MESSAGE (OUTPATIENT)
Dept: AUDIOLOGY | Facility: CLINIC | Age: 84
End: 2019-01-28

## 2019-01-28 LAB — PROT PATTERN UR ELPH-IMP: NORMAL

## 2019-01-28 NOTE — TELEPHONE ENCOUNTER
----- Message from Charmaine Ludwig sent at 1/28/2019  9:55 AM CST -----  Contact: Adam Gallagher  States the pt is returning a missed call, can be reached at [phone]///[id number]

## 2019-01-28 NOTE — TELEPHONE ENCOUNTER
Returned called to patient relative Adam Gallagher. She is requesting results from labs and x ray. She would also like to know does Dr. Saravia Recommend the patient do a bone marrow biopsy? Can the patient continue ballroom dancing and using his exercise bike? Does the patient need to complete the additional labs ordered by Dr. Saravia? Informed patient that I will send a message to MD with all her questions as he is out today.

## 2019-01-28 NOTE — TELEPHONE ENCOUNTER
----- Message from Marilynn Champagne sent at 1/28/2019  9:15 AM CST -----  Contact: Pt daughter in law   Pt daughter in law is calling regarding requesting question on important test that Pt will be taking. .[phone] (home)       .Thank You  Genesis Champagne

## 2019-01-29 ENCOUNTER — OFFICE VISIT (OUTPATIENT)
Dept: HEMATOLOGY/ONCOLOGY | Facility: CLINIC | Age: 84
End: 2019-01-29
Payer: MEDICARE

## 2019-01-29 VITALS
WEIGHT: 160.25 LBS | OXYGEN SATURATION: 98 % | HEART RATE: 74 BPM | HEIGHT: 68 IN | TEMPERATURE: 98 F | BODY MASS INDEX: 24.29 KG/M2 | RESPIRATION RATE: 18 BRPM | DIASTOLIC BLOOD PRESSURE: 80 MMHG | SYSTOLIC BLOOD PRESSURE: 124 MMHG

## 2019-01-29 DIAGNOSIS — D53.9 MACROCYTIC ANEMIA: ICD-10-CM

## 2019-01-29 DIAGNOSIS — M80.88XA OTHER OSTEOPOROSIS WITH CURRENT PATHOLOGICAL FRACTURE, VERTEBRA(E), INITIAL ENCOUNTER FOR FRACTURE: ICD-10-CM

## 2019-01-29 DIAGNOSIS — R76.8 ELEVATED SERUM IMMUNOGLOBULIN FREE LIGHT CHAINS: ICD-10-CM

## 2019-01-29 DIAGNOSIS — C79.51 SPINE METASTASIS: ICD-10-CM

## 2019-01-29 DIAGNOSIS — C90.00 MULTIPLE MYELOMA NOT HAVING ACHIEVED REMISSION: Primary | ICD-10-CM

## 2019-01-29 LAB — PATHOLOGIST INTERPRETATION UPE: NORMAL

## 2019-01-29 PROCEDURE — 99999 PR PBB SHADOW E&M-EST. PATIENT-LVL IV: CPT | Mod: PBBFAC,,, | Performed by: INTERNAL MEDICINE

## 2019-01-29 PROCEDURE — 99215 PR OFFICE/OUTPT VISIT, EST, LEVL V, 40-54 MIN: ICD-10-PCS | Mod: S$PBB,,, | Performed by: INTERNAL MEDICINE

## 2019-01-29 PROCEDURE — 99999 PR PBB SHADOW E&M-EST. PATIENT-LVL IV: ICD-10-PCS | Mod: PBBFAC,,, | Performed by: INTERNAL MEDICINE

## 2019-01-29 PROCEDURE — 99214 OFFICE O/P EST MOD 30 MIN: CPT | Mod: PBBFAC,PN | Performed by: INTERNAL MEDICINE

## 2019-01-29 PROCEDURE — 99215 OFFICE O/P EST HI 40 MIN: CPT | Mod: S$PBB,,, | Performed by: INTERNAL MEDICINE

## 2019-01-29 NOTE — PROGRESS NOTES
Subjective:       Patient ID: Fei Vicente Sr. is a 89 y.o. male.    Chief Complaint: Follow-up; Results; Multiple Myeloma; and Anemia    HPI 89-year-old male initially diagnosed with macrocytic anemia patient presents for review of metastatic skeletal survey consistent with lytic lesions possible multiple myeloma    Past Medical History:   Diagnosis Date    Arthritis     Macular degeneration     Retinal detachment      History reviewed. No pertinent family history.  Social History     Socioeconomic History    Marital status:      Spouse name: Not on file    Number of children: Not on file    Years of education: Not on file    Highest education level: Not on file   Social Needs    Financial resource strain: Not on file    Food insecurity - worry: Not on file    Food insecurity - inability: Not on file    Transportation needs - medical: Not on file    Transportation needs - non-medical: Not on file   Occupational History    Not on file   Tobacco Use    Smoking status: Never Smoker    Smokeless tobacco: Never Used   Substance and Sexual Activity    Alcohol use: Not on file    Drug use: Not on file    Sexual activity: Not on file   Other Topics Concern    Not on file   Social History Narrative    Not on file     Past Surgical History:   Procedure Laterality Date    CATARACT EXTRACTION      RETINAL DETACHMENT SURGERY         Labs:  Lab Results   Component Value Date    WBC 6.37 01/08/2019    HGB 12.6 (L) 01/08/2019    HCT 37.8 (L) 01/08/2019     (H) 01/08/2019     (L) 01/08/2019     BMP  Lab Results   Component Value Date     11/19/2018    K 4.2 11/19/2018     11/19/2018    CO2 29 11/19/2018    BUN 17 11/19/2018    CREATININE 0.8 11/19/2018    CALCIUM 10.3 11/19/2018    ANIONGAP 6 (L) 11/19/2018    ESTGFRAFRICA >60.0 11/19/2018    EGFRNONAA >60.0 11/19/2018     Lab Results   Component Value Date    ALT 13 11/19/2018    AST 20 11/19/2018    ALKPHOS 60 11/19/2018     BILITOT 0.5 11/19/2018       Lab Results   Component Value Date    IRON 98 01/08/2019    TIBC 377 01/08/2019    FERRITIN 32 01/08/2019     Lab Results   Component Value Date    [id number] 1532 (H) 01/08/2019     No results found for: FOLATE  Lab Results   Component Value Date    TSH 1.602 01/08/2019         Review of Systems   Constitutional: Positive for activity change, appetite change and fatigue. Negative for chills, diaphoresis, fever and unexpected weight change.   HENT: Negative for congestion, dental problem, drooling, ear discharge, ear pain, facial swelling, hearing loss, mouth sores, nosebleeds, postnasal drip, rhinorrhea, sinus pressure, sneezing, sore throat, tinnitus, trouble swallowing and voice change.    Eyes: Negative for photophobia, pain, discharge, redness, itching and visual disturbance.   Respiratory: Negative for apnea, cough, choking, chest tightness, shortness of breath, wheezing and stridor.    Cardiovascular: Negative for chest pain, palpitations and leg swelling.   Gastrointestinal: Negative for abdominal distention, abdominal pain, anal bleeding, blood in stool, constipation, diarrhea, nausea, rectal pain and vomiting.   Endocrine: Negative for cold intolerance, heat intolerance, polydipsia, polyphagia and polyuria.   Genitourinary: Negative for decreased urine volume, difficulty urinating, discharge, dysuria, enuresis, flank pain, frequency, genital sores, hematuria, penile pain, penile swelling, scrotal swelling, testicular pain and urgency.   Musculoskeletal: Negative for arthralgias, back pain, gait problem, joint swelling, myalgias, neck pain and neck stiffness.   Skin: Negative for color change, pallor, rash and wound.   Allergic/Immunologic: Negative for environmental allergies, food allergies and immunocompromised state.   Neurological: Positive for weakness. Negative for dizziness, tremors, seizures, syncope, facial asymmetry, speech difficulty, light-headedness, numbness and  headaches.   Hematological: Negative for adenopathy. Does not bruise/bleed easily.   Psychiatric/Behavioral: Positive for dysphoric mood. Negative for agitation, behavioral problems, confusion, decreased concentration, hallucinations, self-injury, sleep disturbance and suicidal ideas. The patient is nervous/anxious. The patient is not hyperactive.        Objective:      Physical Exam   Constitutional: He is oriented to person, place, and time. He appears well-developed and well-nourished. He appears distressed.   HENT:   Head: Normocephalic.   Right Ear: External ear normal.   Left Ear: External ear normal.   Nose: Nose normal. Right sinus exhibits no maxillary sinus tenderness and no frontal sinus tenderness. Left sinus exhibits no maxillary sinus tenderness and no frontal sinus tenderness.   Mouth/Throat: Oropharynx is clear and moist. No oropharyngeal exudate.   Eyes: EOM and lids are normal. Pupils are equal, round, and reactive to light. Right eye exhibits no discharge. Left eye exhibits no discharge. Right conjunctiva is not injected. Right conjunctiva has no hemorrhage. Left conjunctiva is not injected. Left conjunctiva has no hemorrhage. No scleral icterus. Right eye exhibits normal extraocular motion. Left eye exhibits normal extraocular motion.   Neck: Normal range of motion. Neck supple. No JVD present. No tracheal deviation present. No thyromegaly present.   Cardiovascular: Normal rate and regular rhythm.   Pulmonary/Chest: Effort normal. No stridor. No respiratory distress.   Abdominal: Soft. He exhibits no mass. There is no hepatosplenomegaly, splenomegaly or hepatomegaly. There is no tenderness.   Musculoskeletal: Normal range of motion. He exhibits no edema or tenderness.   Lymphadenopathy:        Head (right side): No posterior auricular and no occipital adenopathy present.        Head (left side): No posterior auricular and no occipital adenopathy present.     He has no cervical adenopathy.         Right cervical: No superficial cervical, no deep cervical and no posterior cervical adenopathy present.       Left cervical: No superficial cervical, no deep cervical and no posterior cervical adenopathy present.     He has no axillary adenopathy.        Right: No supraclavicular adenopathy present.        Left: No supraclavicular adenopathy present.   Neurological: He is alert and oriented to person, place, and time. He has normal strength. No cranial nerve deficit. Coordination normal.   Skin: Skin is dry. No rash noted. He is not diaphoretic. No cyanosis or erythema. Nails show no clubbing.   Psychiatric: He has a normal mood and affect. His behavior is normal. Judgment and thought content normal. Cognition and memory are normal.   Vitals reviewed.          Assessment:      1. Multiple myeloma not having achieved remission    2. Spine metastasis    3. Other osteoporosis with current pathological fracture, vertebra(e), initial encounter for fracture     4. Macrocytic anemia    5. Elevated serum immunoglobulin free light chains           Plan:     Extensive conversation with family present reviewed most recent metastatic skeletal survey demonstrating 2 lytic lesions in skull none other.  Will proceed with PET scan to determine extent of disease 24 hr urine reveals minimal amount of protein in urine discussed the spectrum of MGUS to multiple myeloma and smoldering myeloma article followed to family patient will return after DEXA scan as well as PET scan for review determine whether not bone marrow aspirate biopsy is warranted and 2 whether not continuation of treatment options discussed.  Photographs of skull lytic lesions taken by family 40 min face-to-face time discussion        Ashok Saravia Jr, MD FACP

## 2019-02-01 ENCOUNTER — TELEPHONE (OUTPATIENT)
Dept: RADIOLOGY | Facility: HOSPITAL | Age: 84
End: 2019-02-01

## 2019-02-04 ENCOUNTER — HOSPITAL ENCOUNTER (OUTPATIENT)
Dept: RADIOLOGY | Facility: HOSPITAL | Age: 84
Discharge: HOME | End: 2019-02-04
Attending: INTERNAL MEDICINE
Payer: MEDICARE

## 2019-02-04 DIAGNOSIS — C79.51 SPINE METASTASIS: ICD-10-CM

## 2019-02-04 DIAGNOSIS — C90.00 MULTIPLE MYELOMA NOT HAVING ACHIEVED REMISSION: ICD-10-CM

## 2019-02-04 PROCEDURE — 78815 PET IMAGE W/CT SKULL-THIGH: CPT | Mod: TC,PI

## 2019-02-04 PROCEDURE — 78815 NM PET CT ROUTINE: ICD-10-PCS | Mod: 26,PI,, | Performed by: RADIOLOGY

## 2019-02-04 PROCEDURE — A9552 F18 FDG: HCPCS

## 2019-02-04 PROCEDURE — 78815 PET IMAGE W/CT SKULL-THIGH: CPT | Mod: 26,PI,, | Performed by: RADIOLOGY

## 2019-02-07 ENCOUNTER — OFFICE VISIT (OUTPATIENT)
Dept: HEMATOLOGY/ONCOLOGY | Facility: CLINIC | Age: 84
End: 2019-02-07
Payer: MEDICARE

## 2019-02-07 VITALS
HEART RATE: 77 BPM | RESPIRATION RATE: 17 BRPM | WEIGHT: 157.19 LBS | TEMPERATURE: 98 F | HEIGHT: 68 IN | BODY MASS INDEX: 23.82 KG/M2 | DIASTOLIC BLOOD PRESSURE: 67 MMHG | OXYGEN SATURATION: 94 % | SYSTOLIC BLOOD PRESSURE: 124 MMHG

## 2019-02-07 DIAGNOSIS — D47.2 MGUS (MONOCLONAL GAMMOPATHY OF UNKNOWN SIGNIFICANCE): ICD-10-CM

## 2019-02-07 DIAGNOSIS — D53.9 MACROCYTIC ANEMIA: ICD-10-CM

## 2019-02-07 DIAGNOSIS — R76.8 ELEVATED SERUM IMMUNOGLOBULIN FREE LIGHT CHAINS: Primary | ICD-10-CM

## 2019-02-07 PROCEDURE — 99213 OFFICE O/P EST LOW 20 MIN: CPT | Mod: PBBFAC | Performed by: INTERNAL MEDICINE

## 2019-02-07 PROCEDURE — 99214 PR OFFICE/OUTPT VISIT, EST, LEVL IV, 30-39 MIN: ICD-10-PCS | Mod: S$PBB,,, | Performed by: INTERNAL MEDICINE

## 2019-02-07 PROCEDURE — 99999 PR PBB SHADOW E&M-EST. PATIENT-LVL III: ICD-10-PCS | Mod: PBBFAC,,, | Performed by: INTERNAL MEDICINE

## 2019-02-07 PROCEDURE — 99214 OFFICE O/P EST MOD 30 MIN: CPT | Mod: S$PBB,,, | Performed by: INTERNAL MEDICINE

## 2019-02-07 PROCEDURE — 99999 PR PBB SHADOW E&M-EST. PATIENT-LVL III: CPT | Mod: PBBFAC,,, | Performed by: INTERNAL MEDICINE

## 2019-02-07 NOTE — PROGRESS NOTES
Distress Screening Results: Psychosocial Distress screening score of Distress Score: 2 {AMB ONC DISTRESS SCORE:35426}

## 2019-02-07 NOTE — PROGRESS NOTES
Subjective:       Patient ID: Fei Vicente Sr. is a 89 y.o. male.    Chief Complaint: Results and Multiple Myeloma    HPI 89-year-old male with monoclonal gammopathy undetermined significance with 1 focal lytic lesion in bone skull.  Patient presents for review of PET scan ECOG status 2    Past Medical History:   Diagnosis Date    Arthritis     Macular degeneration     Retinal detachment      History reviewed. No pertinent family history.  Social History     Socioeconomic History    Marital status:      Spouse name: Not on file    Number of children: Not on file    Years of education: Not on file    Highest education level: Not on file   Social Needs    Financial resource strain: Not on file    Food insecurity - worry: Not on file    Food insecurity - inability: Not on file    Transportation needs - medical: Not on file    Transportation needs - non-medical: Not on file   Occupational History    Not on file   Tobacco Use    Smoking status: Never Smoker    Smokeless tobacco: Never Used   Substance and Sexual Activity    Alcohol use: Not on file    Drug use: Not on file    Sexual activity: Not on file   Other Topics Concern    Not on file   Social History Narrative    Not on file     Past Surgical History:   Procedure Laterality Date    CATARACT EXTRACTION      RETINAL DETACHMENT SURGERY         Labs:  Lab Results   Component Value Date    WBC 6.37 01/08/2019    HGB 12.6 (L) 01/08/2019    HCT 37.8 (L) 01/08/2019     (H) 01/08/2019     (L) 01/08/2019     BMP  Lab Results   Component Value Date     11/19/2018    K 4.2 11/19/2018     11/19/2018    CO2 29 11/19/2018    BUN 17 11/19/2018    CREATININE 0.8 11/19/2018    CALCIUM 10.3 11/19/2018    ANIONGAP 6 (L) 11/19/2018    ESTGFRAFRICA >60.0 11/19/2018    EGFRNONAA >60.0 11/19/2018     Lab Results   Component Value Date    ALT 13 11/19/2018    AST 20 11/19/2018    ALKPHOS 60 11/19/2018    BILITOT 0.5 11/19/2018        Lab Results   Component Value Date    IRON 98 01/08/2019    TIBC 377 01/08/2019    FERRITIN 32 01/08/2019     Lab Results   Component Value Date    [id number] 1532 (H) 01/08/2019     No results found for: FOLATE  Lab Results   Component Value Date    TSH 1.602 01/08/2019         Review of Systems   Constitutional: Negative for activity change, appetite change, chills, diaphoresis, fatigue, fever and unexpected weight change.   HENT: Negative for congestion, dental problem, drooling, ear discharge, ear pain, facial swelling, hearing loss, mouth sores, nosebleeds, postnasal drip, rhinorrhea, sinus pressure, sneezing, sore throat, tinnitus, trouble swallowing and voice change.    Eyes: Negative for photophobia, pain, discharge, redness, itching and visual disturbance.   Respiratory: Negative for apnea, cough, choking, chest tightness, shortness of breath, wheezing and stridor.    Cardiovascular: Negative for chest pain, palpitations and leg swelling.   Gastrointestinal: Negative for abdominal distention, abdominal pain, anal bleeding, blood in stool, constipation, diarrhea, nausea, rectal pain and vomiting.   Endocrine: Negative for cold intolerance, heat intolerance, polydipsia, polyphagia and polyuria.   Genitourinary: Negative for decreased urine volume, difficulty urinating, discharge, dysuria, enuresis, flank pain, frequency, genital sores, hematuria, penile pain, penile swelling, scrotal swelling, testicular pain and urgency.   Musculoskeletal: Positive for arthralgias and gait problem. Negative for back pain, joint swelling, myalgias, neck pain and neck stiffness.   Skin: Negative for color change, pallor, rash and wound.   Allergic/Immunologic: Negative for environmental allergies, food allergies and immunocompromised state.   Neurological: Positive for weakness. Negative for dizziness, tremors, seizures, syncope, facial asymmetry, speech difficulty, light-headedness, numbness and headaches.    Hematological: Negative for adenopathy. Does not bruise/bleed easily.   Psychiatric/Behavioral: Positive for dysphoric mood. Negative for agitation, behavioral problems, confusion, decreased concentration, hallucinations, self-injury, sleep disturbance and suicidal ideas. The patient is nervous/anxious. The patient is not hyperactive.        Objective:      Physical Exam   Constitutional: He is oriented to person, place, and time. He appears well-developed and well-nourished. He appears distressed.   HENT:   Head: Normocephalic.   Right Ear: External ear normal.   Left Ear: External ear normal.   Nose: Nose normal. Right sinus exhibits no maxillary sinus tenderness and no frontal sinus tenderness. Left sinus exhibits no maxillary sinus tenderness and no frontal sinus tenderness.   Mouth/Throat: Oropharynx is clear and moist. No oropharyngeal exudate.   Eyes: EOM and lids are normal. Pupils are equal, round, and reactive to light. Right eye exhibits no discharge. Left eye exhibits no discharge. Right conjunctiva is not injected. Right conjunctiva has no hemorrhage. Left conjunctiva is not injected. Left conjunctiva has no hemorrhage. No scleral icterus. Right eye exhibits normal extraocular motion. Left eye exhibits normal extraocular motion.   Neck: Normal range of motion. Neck supple. No JVD present. No tracheal deviation present. No thyromegaly present.   Cardiovascular: Normal rate and regular rhythm.   Pulmonary/Chest: Effort normal. No stridor. No respiratory distress.   Abdominal: Soft. He exhibits no mass. There is no hepatosplenomegaly, splenomegaly or hepatomegaly. There is no tenderness.   Musculoskeletal: Normal range of motion. He exhibits no edema or tenderness.   Lymphadenopathy:        Head (right side): No posterior auricular and no occipital adenopathy present.        Head (left side): No posterior auricular and no occipital adenopathy present.     He has no cervical adenopathy.        Right  cervical: No superficial cervical, no deep cervical and no posterior cervical adenopathy present.       Left cervical: No superficial cervical, no deep cervical and no posterior cervical adenopathy present.     He has no axillary adenopathy.        Right: No supraclavicular adenopathy present.        Left: No supraclavicular adenopathy present.   Neurological: He is alert and oriented to person, place, and time. He has normal strength. No cranial nerve deficit. Coordination abnormal.   Skin: Skin is dry. No rash noted. He is not diaphoretic. No cyanosis or erythema. Nails show no clubbing.   Psychiatric: He has a normal mood and affect. His behavior is normal. Judgment and thought content normal. Cognition and memory are normal.   Vitals reviewed.          Assessment:      1. Elevated serum immunoglobulin free light chains    2. Macrocytic anemia    3. MGUS (monoclonal gammopathy of unknown significance)           Plan:   Elevated free light chain with 1 focal area in skull PET scan no evidence of hypermetabolic activity in bone.  At this point reviewed information family urine no evidence of paraprotein follow-up in 2-3 months close follow-up for proceed with bone marrow aspirate biopsy discussed implications with him          Ashok Saravia Jr, MD FACP

## 2019-02-13 ENCOUNTER — PROCEDURE VISIT (OUTPATIENT)
Dept: OPHTHALMOLOGY | Facility: CLINIC | Age: 84
End: 2019-02-13
Payer: MEDICARE

## 2019-02-13 DIAGNOSIS — H35.3231 EXUDATIVE AGE-RELATED MACULAR DEGENERATION OF BOTH EYES WITH ACTIVE CHOROIDAL NEOVASCULARIZATION: Primary | ICD-10-CM

## 2019-02-13 PROCEDURE — 67028 INJECTION EYE DRUG: CPT | Mod: 50,PBBFAC,PN | Performed by: OPHTHALMOLOGY

## 2019-02-13 PROCEDURE — 92134 POSTERIOR SEGMENT OCT RETINA (OCULAR COHERENCE TOMOGRAPHY)-BOTH EYES: ICD-10-PCS | Mod: 26,S$PBB,, | Performed by: OPHTHALMOLOGY

## 2019-02-13 PROCEDURE — 67028 INJECTION EYE DRUG: CPT | Mod: 50,S$PBB,, | Performed by: OPHTHALMOLOGY

## 2019-02-13 PROCEDURE — 99499 NO LOS: ICD-10-PCS | Mod: S$PBB,,, | Performed by: OPHTHALMOLOGY

## 2019-02-13 PROCEDURE — 67028 PR INJECT INTRAVITREAL PHARMCOLOGIC: ICD-10-PCS | Mod: 50,S$PBB,, | Performed by: OPHTHALMOLOGY

## 2019-02-13 PROCEDURE — 92134 CPTRZ OPH DX IMG PST SGM RTA: CPT | Mod: PBBFAC,PN | Performed by: OPHTHALMOLOGY

## 2019-02-13 PROCEDURE — 99499 UNLISTED E&M SERVICE: CPT | Mod: S$PBB,,, | Performed by: OPHTHALMOLOGY

## 2019-02-13 RX ORDER — POLYMYXIN B SULFATE AND TRIMETHOPRIM 1; 10000 MG/ML; [USP'U]/ML
1 SOLUTION OPHTHALMIC 4 TIMES DAILY
Qty: 10 ML | Refills: 0 | Status: SHIPPED | OUTPATIENT
Start: 2019-02-13 | End: 2019-03-15

## 2019-02-13 RX ADMIN — AFLIBERCEPT 2 MG: 40 INJECTION, SOLUTION INTRAVITREAL at 11:02

## 2019-02-13 NOTE — PROGRESS NOTES
===============================  02/13/2019   Fei Vicente Sr.,   89 y.o. male   Last visit [facility]: :1/3/2019   Last visit eye dept. Visit date not found  [facility]:  Corrected distance visual acuity was 20/50 -2 in the right eye and 20/400 in the left eye.   Not recorded         Not recorded         Not recorded        Chief Complaint   Patient presents with    SRN     pt here for eylea both eyes.         HPI     SRN      Additional comments: pt here for eylea both eyes.               Comments      Bilateral SRN  Previously treated at [facility] by Dr. Amara MILAN OU 1/3/19          Last edited by Audie Baig on 2/13/2019 10:36 AM. (History)          ________________  2/13/2019  Problem List Items Addressed This Visit        Eye/Vision problems    Exudative age-related macular degeneration of both eyes with active choroidal neovascularization - Primary    Relevant Medications    aflibercept Soln 2 mg (Completed)    aflibercept Soln 2 mg (Completed)    polymyxin B sulf-trimethoprim (POLYTRIM) 10,000 unit- 1 mg/mL Drop    Other Relevant Orders    Posterior Segment OCT Retina-Both eyes    Prior Authorization Order        Ou srn   trila today eyla #22/4  note ^ cd .6 .7  Oct no change ou ' va better od but p[t unaware      2/13/2019  Diagnosis :  Ou srn  Today:   Eylea (afibercept) 2 mg/0.05 ml Intravitreal Injection , OU   Follow up: rtc 1 mo for eyle3/4 trria        Instructed to call 24/7 for any worsening of vision. Check Both eyes daily. Gave patient my home phone number.      Procedure  Note:   OU}  Eylea (afibercept) 2 mg/0.05 ml Intravitreal Injection    I have explained the Risks, Benefits and Alternatives of the procedure in detail.  The patient voices understanding and all questions have been answered.  The patient agrees to proceed as discussed.  Xylocaine with Epi 2%  subconj bleb  was used for anesthesia.  Topical betadine was used for antisepsis.  0.05 cc was  injected 3.7 mm from corneal  limbus in the inferotemporal quadrant.  Following injection the IOP was less than thirty (<30) by tonopen.  The eye was then thoroughly irrigated with BSS.  Patient tolerated procedure well.  No complications were observed.  The Patient was educated that mild irritation tonight was normal secondary to topical antispsis use.  Pt was advised to call at any time day or night for pain, redness, or any decline in vision. I gave the patient my home number as well as the clinic on call number. Daily visual checks and Amsler grid testing were reviewed.  polytrim Antibiotic Drops to be used 4 times daily for 4 days  PAUL Delgadillo MD  Procedure ordered: y  Consent: y  Pre auth: y  MAR:y  Opnote: y  Charge capture:y    '  .       ===========================

## 2019-03-15 ENCOUNTER — PROCEDURE VISIT (OUTPATIENT)
Dept: OPHTHALMOLOGY | Facility: CLINIC | Age: 84
End: 2019-03-15
Payer: MEDICARE

## 2019-03-15 DIAGNOSIS — H35.3231 EXUDATIVE AGE-RELATED MACULAR DEGENERATION OF BOTH EYES WITH ACTIVE CHOROIDAL NEOVASCULARIZATION: Primary | ICD-10-CM

## 2019-03-15 PROCEDURE — 92134 POSTERIOR SEGMENT OCT RETINA (OCULAR COHERENCE TOMOGRAPHY)-BOTH EYES: ICD-10-PCS | Mod: 26,S$PBB,, | Performed by: OPHTHALMOLOGY

## 2019-03-15 PROCEDURE — 67028 INJECTION EYE DRUG: CPT | Mod: 50,S$PBB,, | Performed by: OPHTHALMOLOGY

## 2019-03-15 PROCEDURE — 99499 UNLISTED E&M SERVICE: CPT | Mod: S$PBB,,, | Performed by: OPHTHALMOLOGY

## 2019-03-15 PROCEDURE — 99499 NO LOS: ICD-10-PCS | Mod: S$PBB,,, | Performed by: OPHTHALMOLOGY

## 2019-03-15 PROCEDURE — 67028 INJECTION EYE DRUG: CPT | Mod: PBBFAC,PN,50 | Performed by: OPHTHALMOLOGY

## 2019-03-15 PROCEDURE — 67028 PR INJECT INTRAVITREAL PHARMCOLOGIC: ICD-10-PCS | Mod: 50,S$PBB,, | Performed by: OPHTHALMOLOGY

## 2019-03-15 PROCEDURE — 92134 CPTRZ OPH DX IMG PST SGM RTA: CPT | Mod: PBBFAC,PN | Performed by: OPHTHALMOLOGY

## 2019-03-15 RX ADMIN — AFLIBERCEPT 2 MG: 40 INJECTION, SOLUTION INTRAVITREAL at 02:03

## 2019-03-15 NOTE — PROGRESS NOTES
===============================  03/15/2019   Fei Vicente Sr.,   89 y.o. male   Last visit [facility]: :2/13/2019   Last visit eye dept. 2/13/2019  [facility]:  Uncorrected distance visual acuity was 20/100 in the right eye and 20/400 in the left eye.   Not recorded         Not recorded         Not recorded        Chief Complaint   Patient presents with    Macular Degeneration     eylea ou        HPI     Macular Degeneration      Additional comments: eylea ou              Comments     Bilateral SRN  Previously treated at [facility] by Dr. Maloney    EYLEA OU 2/13/19          Last edited by Claudia Chin on 3/15/2019  1:38 PM. (History)          ________________  3/15/2019  Problem List Items Addressed This Visit        Eye/Vision problems    Exudative age-related macular degeneration of both eyes with active choroidal neovascularization - Primary    Relevant Medications    aflibercept Soln 2 mg (Completed) (Start on 3/15/2019  2:15 PM)    aflibercept Soln 2 mg (Completed) (Start on 3/15/2019  2:15 PM)    Other Relevant Orders    Prior Authorization Order    Posterior Segment OCT Retina-Both eyes (Completed)        [facility] ...   oct very stabeel    dry eye  rec ^ ats    eyela ou       3/15/2019  Diagnosis :  Ou srn  Today:   Eylea (afibercept) 2 mg/0.05 ml Intravitreal Injection , OU   Follow up: rtc  1mo         Instructed to call 24/7 for any worsening of vision. Check Both eyes daily. Gave patient my home phone number.      Procedure  Note:   OU}  Eylea (afibercept) 2 mg/0.05 ml Intravitreal Injection    I have explained the Risks, Benefits and Alternatives of the procedure in detail.  The patient voices understanding and all questions have been answered.  The patient agrees to proceed as discussed.  Xylocaine with Epi 2%  Sub conj bleb  was used for anesthesia.  Topical betadine was used for antisepsis.  0.05 cc was  injected 3.7 mm from corneal limbus in the inferotemporal quadrant.  Following injection the IOP  was less than thirty (<30) by tonopen.  The eye was then thoroughly irrigated with BSS.  Patient tolerated procedure well.  No complications were observed.  The Patient was educated that mild irritation tonight was normal secondary to topical antispsis use.  Pt was advised to call at any time day or night for pain, redness, or any decline in vision. I gave the patient my home number as well as the clinic on call number. Daily visual checks and Amsler grid testing were reviewed.  polytrim Antibiotic Drops to be used 4 times daily for 4 days  PAUL Delgadilol MD  Procedure ordered: y  Consent: y  Pre auth: y  MAR:y  Opnote: y  Charge capture:y      .       ===========================

## 2019-04-15 ENCOUNTER — PROCEDURE VISIT (OUTPATIENT)
Dept: OPHTHALMOLOGY | Facility: CLINIC | Age: 84
End: 2019-04-15
Payer: MEDICARE

## 2019-04-15 DIAGNOSIS — H35.3231 EXUDATIVE AGE-RELATED MACULAR DEGENERATION OF BOTH EYES WITH ACTIVE CHOROIDAL NEOVASCULARIZATION: Primary | ICD-10-CM

## 2019-04-15 PROCEDURE — 99499 NO LOS: ICD-10-PCS | Mod: S$PBB,,, | Performed by: OPHTHALMOLOGY

## 2019-04-15 PROCEDURE — 67028 INJECTION EYE DRUG: CPT | Mod: 50,PBBFAC,PN | Performed by: OPHTHALMOLOGY

## 2019-04-15 PROCEDURE — 92134 POSTERIOR SEGMENT OCT RETINA (OCULAR COHERENCE TOMOGRAPHY)-BOTH EYES: ICD-10-PCS | Mod: 26,S$PBB,, | Performed by: OPHTHALMOLOGY

## 2019-04-15 PROCEDURE — 67028 PR INJECT INTRAVITREAL PHARMCOLOGIC: ICD-10-PCS | Mod: 50,S$PBB,, | Performed by: OPHTHALMOLOGY

## 2019-04-15 PROCEDURE — 92134 CPTRZ OPH DX IMG PST SGM RTA: CPT | Mod: PBBFAC,PN | Performed by: OPHTHALMOLOGY

## 2019-04-15 PROCEDURE — 67028 INJECTION EYE DRUG: CPT | Mod: 50,S$PBB,, | Performed by: OPHTHALMOLOGY

## 2019-04-15 PROCEDURE — 99499 UNLISTED E&M SERVICE: CPT | Mod: S$PBB,,, | Performed by: OPHTHALMOLOGY

## 2019-04-15 RX ADMIN — AFLIBERCEPT 2 MG: 40 INJECTION, SOLUTION INTRAVITREAL at 01:04

## 2019-04-15 NOTE — PROGRESS NOTES
"    ===============================  04/15/2019   Fei Vicente Sr.,   89 y.o. male   Last visit [facility]: :3/15/2019   Last visit eye dept. 3/15/2019  [facility]:  Uncorrected distance visual acuity was 20/60 in the right eye and 20/200 in the left eye.   Not recorded         Not recorded         Not recorded        Chief Complaint   Patient presents with    Macular Degeneration     eylea ou        HPI     Macular Degeneration      Additional comments: eylea ou              Comments     Bilateral SRN  Previously treated at [facility] by Dr. Maloney    EYLEA OU 3/15/19          Last edited by Claudia Chin on 4/15/2019  1:02 PM. (History)          ________________  4/15/2019  Problem List Items Addressed This Visit        Eye/Vision problems    Exudative age-related macular degeneration of both eyes with active choroidal neovascularization - Primary    Relevant Medications    aflibercept Soln 2 mg (Completed)    aflibercept Soln 2 mg (Completed)    Other Relevant Orders    Posterior Segment OCT Retina-Both eyes (Completed)    Prior Authorization Order        Ou srn    oct od very stable   Os sl better    btter va 20/60 od  . Sp eyel x4  Today #5  Oct better from November  And OD good vision  So continue injections, slow txx  Goal is to "keep th eod from ending up lie os"    4/15/2019  Diagnosis :  Ou srn  Today:   Eylea (afibercept) 2 mg/0.05 ml Intravitreal Injection , OU   Follow up: rtc 5 weeks        Instructed to call 24/7 for any worsening of vision. Check Both eyes daily. Gave patient my home phone number.      Procedure  Note:   OU}  Eylea (afibercept) 2 mg/0.05 ml Intravitreal Injection    I have explained the Risks, Benefits and Alternatives of the procedure in detail.  The patient voices understanding and all questions have been answered.  The patient agrees to proceed as discussed.  Xylocaine with Epi 2%  subconj bleb  was used for anesthesia.  Topical betadine was used for antisepsis.  0.05 cc was  " injected 3.7 mm from corneal limbus in the inferotemporal quadrant.  Following injection the IOP was less than thirty (<30) by tonopen.  The eye was then thoroughly irrigated with BSS.  Patient tolerated procedure well.  No complications were observed.  The Patient was educated that mild irritation tonight was normal secondary to topical antispsis use.  Pt was advised to call at any time day or night for pain, redness, or any decline in vision. I gave the patient my home number as well as the clinic on call number. Daily visual checks and Amsler grid testing were reviewed.  polytrim Antibiotic Drops to be used 4 times daily for 4 days  PAUL Delgadillo MD  Procedure ordered: y  Consent: y  Pre auth: y  MAR:y  Opnote: y  Charge capture:y           ===========================

## 2019-05-01 ENCOUNTER — LAB VISIT (OUTPATIENT)
Dept: LAB | Facility: HOSPITAL | Age: 84
End: 2019-05-01
Attending: INTERNAL MEDICINE
Payer: MEDICARE

## 2019-05-01 DIAGNOSIS — D53.9 MACROCYTIC ANEMIA: ICD-10-CM

## 2019-05-01 DIAGNOSIS — R76.8 ELEVATED SERUM IMMUNOGLOBULIN FREE LIGHT CHAINS: ICD-10-CM

## 2019-05-01 DIAGNOSIS — D47.2 MGUS (MONOCLONAL GAMMOPATHY OF UNKNOWN SIGNIFICANCE): ICD-10-CM

## 2019-05-01 LAB
ALBUMIN SERPL BCP-MCNC: 3.6 G/DL (ref 3.5–5.2)
ALP SERPL-CCNC: 62 U/L (ref 55–135)
ALT SERPL W/O P-5'-P-CCNC: 12 U/L (ref 10–44)
ANION GAP SERPL CALC-SCNC: 6 MMOL/L (ref 8–16)
AST SERPL-CCNC: 16 U/L (ref 10–40)
B2 MICROGLOB SERPL-MCNC: 2.6 UG/ML (ref 0–2.5)
BASOPHILS # BLD AUTO: 0.02 K/UL (ref 0–0.2)
BASOPHILS NFR BLD: 0.3 % (ref 0–1.9)
BILIRUB SERPL-MCNC: 0.5 MG/DL (ref 0.1–1)
BUN SERPL-MCNC: 17 MG/DL (ref 10–30)
CALCIUM SERPL-MCNC: 10.5 MG/DL (ref 8.7–10.5)
CHLORIDE SERPL-SCNC: 105 MMOL/L (ref 95–110)
CO2 SERPL-SCNC: 26 MMOL/L (ref 23–29)
CREAT SERPL-MCNC: 0.9 MG/DL (ref 0.5–1.4)
DIFFERENTIAL METHOD: ABNORMAL
EOSINOPHIL # BLD AUTO: 0.3 K/UL (ref 0–0.5)
EOSINOPHIL NFR BLD: 5.3 % (ref 0–8)
ERYTHROCYTE [DISTWIDTH] IN BLOOD BY AUTOMATED COUNT: 12.3 % (ref 11.5–14.5)
EST. GFR  (AFRICAN AMERICAN): >60 ML/MIN/1.73 M^2
EST. GFR  (NON AFRICAN AMERICAN): >60 ML/MIN/1.73 M^2
GLUCOSE SERPL-MCNC: 83 MG/DL (ref 70–110)
HCT VFR BLD AUTO: 38.3 % (ref 40–54)
HGB BLD-MCNC: 12.5 G/DL (ref 14–18)
IMM GRANULOCYTES # BLD AUTO: 0.01 K/UL (ref 0–0.04)
IMM GRANULOCYTES NFR BLD AUTO: 0.2 % (ref 0–0.5)
LYMPHOCYTES # BLD AUTO: 1.8 K/UL (ref 1–4.8)
LYMPHOCYTES NFR BLD: 29.8 % (ref 18–48)
MCH RBC QN AUTO: 33.8 PG (ref 27–31)
MCHC RBC AUTO-ENTMCNC: 32.6 G/DL (ref 32–36)
MCV RBC AUTO: 104 FL (ref 82–98)
MONOCYTES # BLD AUTO: 0.7 K/UL (ref 0.3–1)
MONOCYTES NFR BLD: 11.6 % (ref 4–15)
NEUTROPHILS # BLD AUTO: 3.1 K/UL (ref 1.8–7.7)
NEUTROPHILS NFR BLD: 53 % (ref 38–73)
NRBC BLD-RTO: 0 /100 WBC
PLATELET # BLD AUTO: 88 K/UL (ref 150–350)
PMV BLD AUTO: 9.8 FL (ref 9.2–12.9)
POTASSIUM SERPL-SCNC: 4.1 MMOL/L (ref 3.5–5.1)
PROT SERPL-MCNC: 5.7 G/DL (ref 6–8.4)
RBC # BLD AUTO: 3.7 M/UL (ref 4.6–6.2)
SODIUM SERPL-SCNC: 137 MMOL/L (ref 136–145)
WBC # BLD AUTO: 5.87 K/UL (ref 3.9–12.7)

## 2019-05-01 PROCEDURE — 84165 PATHOLOGIST INTERPRETATION SPE: ICD-10-PCS | Mod: 26,,, | Performed by: PATHOLOGY

## 2019-05-01 PROCEDURE — 84165 PROTEIN E-PHORESIS SERUM: CPT | Mod: 26,,, | Performed by: PATHOLOGY

## 2019-05-01 PROCEDURE — 85025 COMPLETE CBC W/AUTO DIFF WBC: CPT

## 2019-05-01 PROCEDURE — 80053 COMPREHEN METABOLIC PANEL: CPT

## 2019-05-01 PROCEDURE — 36415 COLL VENOUS BLD VENIPUNCTURE: CPT

## 2019-05-01 PROCEDURE — 82232 ASSAY OF BETA-2 PROTEIN: CPT

## 2019-05-01 PROCEDURE — 84165 PROTEIN E-PHORESIS SERUM: CPT

## 2019-05-01 PROCEDURE — 83520 IMMUNOASSAY QUANT NOS NONAB: CPT | Mod: 59

## 2019-05-02 LAB
ALBUMIN SERPL ELPH-MCNC: 3.69 G/DL (ref 3.35–5.55)
ALPHA1 GLOB SERPL ELPH-MCNC: 0.24 G/DL (ref 0.17–0.41)
ALPHA2 GLOB SERPL ELPH-MCNC: 0.57 G/DL (ref 0.43–0.99)
B-GLOBULIN SERPL ELPH-MCNC: 0.58 G/DL (ref 0.5–1.1)
GAMMA GLOB SERPL ELPH-MCNC: 0.43 G/DL (ref 0.67–1.58)
KAPPA LC SER QL IA: 1.51 MG/DL (ref 0.33–1.94)
KAPPA LC/LAMBDA SER IA: 0.03 (ref 0.26–1.65)
LAMBDA LC SER QL IA: 53.27 MG/DL (ref 0.57–2.63)
PATHOLOGIST INTERPRETATION SPE: NORMAL
PROT SERPL-MCNC: 5.5 G/DL (ref 6–8.4)

## 2019-05-08 ENCOUNTER — OFFICE VISIT (OUTPATIENT)
Dept: HEMATOLOGY/ONCOLOGY | Facility: CLINIC | Age: 84
End: 2019-05-08
Payer: MEDICARE

## 2019-05-08 VITALS
HEART RATE: 64 BPM | SYSTOLIC BLOOD PRESSURE: 165 MMHG | HEIGHT: 68 IN | DIASTOLIC BLOOD PRESSURE: 74 MMHG | BODY MASS INDEX: 25.07 KG/M2 | TEMPERATURE: 99 F | OXYGEN SATURATION: 94 % | RESPIRATION RATE: 18 BRPM | WEIGHT: 165.38 LBS

## 2019-05-08 DIAGNOSIS — D47.2 MGUS (MONOCLONAL GAMMOPATHY OF UNKNOWN SIGNIFICANCE): Primary | ICD-10-CM

## 2019-05-08 PROCEDURE — 99214 OFFICE O/P EST MOD 30 MIN: CPT | Mod: S$PBB,,, | Performed by: INTERNAL MEDICINE

## 2019-05-08 PROCEDURE — 99999 PR PBB SHADOW E&M-EST. PATIENT-LVL III: CPT | Mod: PBBFAC,,, | Performed by: INTERNAL MEDICINE

## 2019-05-08 PROCEDURE — 99213 OFFICE O/P EST LOW 20 MIN: CPT | Mod: PBBFAC,PN | Performed by: INTERNAL MEDICINE

## 2019-05-08 PROCEDURE — 99214 PR OFFICE/OUTPT VISIT, EST, LEVL IV, 30-39 MIN: ICD-10-PCS | Mod: S$PBB,,, | Performed by: INTERNAL MEDICINE

## 2019-05-08 PROCEDURE — 99999 PR PBB SHADOW E&M-EST. PATIENT-LVL III: ICD-10-PCS | Mod: PBBFAC,,, | Performed by: INTERNAL MEDICINE

## 2019-05-08 RX ORDER — ASPIRIN 325 MG
TABLET ORAL
COMMUNITY
Start: 2009-07-21

## 2019-05-08 RX ORDER — PAROXETINE 10 MG/1
TABLET, FILM COATED ORAL
COMMUNITY
Start: 2009-07-21 | End: 2019-05-24

## 2019-05-08 NOTE — PROGRESS NOTES
Subjective:       Patient ID: Fei Vicente Sr. is a 89 y.o. male.    Chief Complaint: Follow-up and Results    HPI 89-year-old male history of smoldering myeloma patient returns for review patient is doing remarkably well feels good adjusting to his moved from [place] to [place] ECOG status 2    Past Medical History:   Diagnosis Date    Arthritis     Macular degeneration     Retinal detachment      History reviewed. No pertinent family history.  Social History     Socioeconomic History    Marital status:      Spouse name: Not on file    Number of children: Not on file    Years of education: Not on file    Highest education level: Not on file   Occupational History    Not on file   Social Needs    Financial resource strain: Not on file    Food insecurity:     Worry: Not on file     Inability: Not on file    Transportation needs:     Medical: Not on file     Non-medical: Not on file   Tobacco Use    Smoking status: Never Smoker    Smokeless tobacco: Never Used   Substance and Sexual Activity    Alcohol use: Not on file    Drug use: Not on file    Sexual activity: Not on file   Lifestyle    Physical activity:     Days per week: Not on file     Minutes per session: Not on file    Stress: Not on file   Relationships    Social connections:     Talks on phone: Not on file     Gets together: Not on file     Attends [employer] service: Not on file     Active member of club or organization: Not on file     Attends meetings of clubs or organizations: Not on file     Relationship status: Not on file   Other Topics Concern    Not on file   Social History Narrative    Not on file     Past Surgical History:   Procedure Laterality Date    CATARACT EXTRACTION      RETINAL DETACHMENT SURGERY         Labs:  Lab Results   Component Value Date    WBC 5.87 05/01/2019    HGB 12.5 (L) 05/01/2019    HCT 38.3 (L) 05/01/2019     (H) 05/01/2019    PLT 88 (L) 05/01/2019     BMP  Lab Results   Component  Value Date     05/01/2019    K 4.1 05/01/2019     05/01/2019    CO2 26 05/01/2019    BUN 17 05/01/2019    CREATININE 0.9 05/01/2019    CALCIUM 10.5 05/01/2019    ANIONGAP 6 (L) 05/01/2019    ESTGFRAFRICA >60 05/01/2019    EGFRNONAA >60 05/01/2019     Lab Results   Component Value Date    ALT 12 05/01/2019    AST 16 05/01/2019    ALKPHOS 62 05/01/2019    BILITOT 0.5 05/01/2019       Lab Results   Component Value Date    IRON 98 01/08/2019    TIBC 377 01/08/2019    FERRITIN 32 01/08/2019     Lab Results   Component Value Date    XAQJFUZC12 1532 (H) 01/08/2019     No results found for: FOLATE  Lab Results   Component Value Date    TSH 1.602 01/08/2019         Review of Systems   Constitutional: Negative for activity change, appetite change, chills, diaphoresis, fatigue, fever and unexpected weight change.   HENT: Negative for congestion, dental problem, drooling, ear discharge, ear pain, facial swelling, hearing loss, mouth sores, nosebleeds, postnasal drip, rhinorrhea, sinus pressure, sneezing, sore throat, tinnitus, trouble swallowing and voice change.    Eyes: Negative for photophobia, pain, discharge, redness, itching and visual disturbance.   Respiratory: Negative for apnea, cough, choking, chest tightness, shortness of breath, wheezing and stridor.    Cardiovascular: Negative for chest pain, palpitations and leg swelling.   Gastrointestinal: Negative for abdominal distention, abdominal pain, anal bleeding, blood in stool, constipation, diarrhea, nausea, rectal pain and vomiting.   Endocrine: Negative for cold intolerance, heat intolerance, polydipsia, polyphagia and polyuria.   Genitourinary: Negative for decreased urine volume, difficulty urinating, discharge, dysuria, enuresis, flank pain, frequency, genital sores, hematuria, penile pain, penile swelling, scrotal swelling, testicular pain and urgency.   Musculoskeletal: Negative for arthralgias, back pain, gait problem, joint swelling, myalgias,  neck pain and neck stiffness.   Skin: Negative for color change, pallor, rash and wound.   Allergic/Immunologic: Negative for environmental allergies, food allergies and immunocompromised state.   Neurological: Negative for dizziness, tremors, seizures, syncope, facial asymmetry, speech difficulty, weakness, light-headedness, numbness and headaches.   Hematological: Negative for adenopathy. Does not bruise/bleed easily.   Psychiatric/Behavioral: Negative for agitation, behavioral problems, confusion, decreased concentration, dysphoric mood, hallucinations, self-injury, sleep disturbance and suicidal ideas. The patient is not nervous/anxious and is not hyperactive.        Objective:      Physical Exam   Constitutional: He is oriented to person, place, and time. He appears well-developed and well-nourished. He appears distressed.   HENT:   Head: Normocephalic.   Right Ear: External ear normal.   Left Ear: External ear normal.   Nose: Nose normal. Right sinus exhibits no maxillary sinus tenderness and no frontal sinus tenderness. Left sinus exhibits no maxillary sinus tenderness and no frontal sinus tenderness.   Mouth/Throat: Oropharynx is clear and moist. No oropharyngeal exudate.   Eyes: Pupils are equal, round, and reactive to light. EOM and lids are normal. Right eye exhibits no discharge. Left eye exhibits no discharge. Right conjunctiva is not injected. Right conjunctiva has no hemorrhage. Left conjunctiva is not injected. Left conjunctiva has no hemorrhage. No scleral icterus. Right eye exhibits normal extraocular motion. Left eye exhibits normal extraocular motion.   Neck: Normal range of motion. Neck supple. No JVD present. No tracheal deviation present. No thyromegaly present.   Cardiovascular: Normal rate and regular rhythm.   Pulmonary/Chest: Effort normal. No stridor. No respiratory distress.   Abdominal: Soft. He exhibits no mass. There is no hepatosplenomegaly, splenomegaly or hepatomegaly. There is no  tenderness.   Musculoskeletal: Normal range of motion. He exhibits no edema or tenderness.   Lymphadenopathy:        Head (right side): No posterior auricular and no occipital adenopathy present.        Head (left side): No posterior auricular and no occipital adenopathy present.     He has no cervical adenopathy.        Right cervical: No superficial cervical, no deep cervical and no posterior cervical adenopathy present.       Left cervical: No superficial cervical, no deep cervical and no posterior cervical adenopathy present.     He has no axillary adenopathy.        Right: No supraclavicular adenopathy present.        Left: No supraclavicular adenopathy present.   Neurological: He is alert and oriented to person, place, and time. He has normal strength. No cranial nerve deficit. Coordination normal.   Skin: Skin is dry. No rash noted. He is not diaphoretic. No cyanosis or erythema. Nails show no clubbing.   Psychiatric: He has a normal mood and affect. His behavior is normal. Judgment and thought content normal. Cognition and memory are normal.   Vitals reviewed.          Assessment:      1. MGUS (monoclonal gammopathy of unknown significance)           Plan:     Results of laboratories essentially stable no evidence of paraprotein in urine at this point I would recommend follow-up with me in 4 months.  Discussed implications with family recommend patient have pneumonia vaccination as well as recombinant shingles vaccine through pharmacy discussed implications follow-up as detailed        Ashok Saravia Jr, MD FACP

## 2019-05-24 ENCOUNTER — PROCEDURE VISIT (OUTPATIENT)
Dept: OPHTHALMOLOGY | Facility: CLINIC | Age: 84
End: 2019-05-24
Payer: MEDICARE

## 2019-05-24 DIAGNOSIS — H35.3231 EXUDATIVE AGE-RELATED MACULAR DEGENERATION OF BOTH EYES WITH ACTIVE CHOROIDAL NEOVASCULARIZATION: Primary | ICD-10-CM

## 2019-05-24 PROCEDURE — 92134 POSTERIOR SEGMENT OCT RETINA (OCULAR COHERENCE TOMOGRAPHY)-BOTH EYES: ICD-10-PCS | Mod: 26,S$PBB,, | Performed by: OPHTHALMOLOGY

## 2019-05-24 PROCEDURE — 67028 INJECTION EYE DRUG: CPT | Mod: S$PBB,50,, | Performed by: OPHTHALMOLOGY

## 2019-05-24 PROCEDURE — 67028 INJECTION EYE DRUG: CPT | Mod: PBBFAC,50,PN | Performed by: OPHTHALMOLOGY

## 2019-05-24 PROCEDURE — 99499 NO LOS: ICD-10-PCS | Mod: S$PBB,,, | Performed by: OPHTHALMOLOGY

## 2019-05-24 PROCEDURE — 67028 PR INJECT INTRAVITREAL PHARMCOLOGIC: ICD-10-PCS | Mod: S$PBB,50,, | Performed by: OPHTHALMOLOGY

## 2019-05-24 PROCEDURE — 92134 CPTRZ OPH DX IMG PST SGM RTA: CPT | Mod: PBBFAC,PN | Performed by: OPHTHALMOLOGY

## 2019-05-24 PROCEDURE — 99499 UNLISTED E&M SERVICE: CPT | Mod: S$PBB,,, | Performed by: OPHTHALMOLOGY

## 2019-05-24 RX ADMIN — AFLIBERCEPT 2 MG: 40 INJECTION, SOLUTION INTRAVITREAL at 02:05

## 2019-05-24 NOTE — PROGRESS NOTES
"    ===============================  05/24/2019   Fei Vicente Sr.,   89 y.o. male   Last visit [facility]: :4/15/2019   [facility]  [facility]corrected [facility] visual acuity was 20/80 in the right eye and 20/400 in the left eye.   Not recorded         Not recorded         Not recorded        Chief Complaint   Patient presents with    Macular Degeneration     eylea ou        HPI     Macular Degeneration      Additional comments: eylea ou              Comments     Bilateral SRN  Previously [facility] at [facility] by Dr. Maloney    EYLEA OU 4/15/19          Last edited by Claudia Chin on 5/24/2019  2:11 PM. (History)          ________________  5/24/2019  Problem List Items Addressed This Visit     None        od oct bettert  thamn last mo   Getting better"      5/24/2019  Diagnosis :  Ou srn  Today:   Eylea (afibercept) 2 mg/0.05 ml Intravitreal Injection , OU   Follow up: rtc  1mo "getting bettre        Instructed to call 24/7 for any worsening of vision. Check Both eyes daily. Gave patient my home phone number.      Procedure  Note:   OU}  Eylea (afibercept) 2 mg/0.05 ml Intravitreal Injection    I have explained the Risks, Benefits and Alternatives of the procedure in detail.  The patient voices understanding and all questions have been answered.  The patient agrees to proceed as discussed.  Xylocaine with Epi 2%  Sub conj bleb  was used for anesthesia.  Topical betadine was used for antisepsis.  0.05 cc was  injected 3.7 mm from corneal limbus in the inferotemporal quadrant.  Following injection the IOP was less than thirty (<30) by tonopen.  The eye was then thoroughly irrigated with BSS.  Patient tolerated procedure well.  No complications were observed.  The Patient was educated that mild irritation tonight was normal secondary to topical antispsis use.  Pt was advised to call at any time day or night for pain, redness, or any decline in vision. I gave the patient my home number as well as " the clinic on call number. Daily visual checks and Amsler grid testing were reviewed.  polytrim Antibiotic Drops to be used 4 times daily for 4 days  PAUL Delgadillo MD  Procedure ordered: y  Consent: y  Pre auth: y  MAR:y  Opnote: y  Charge capture:y      .       ===========================

## 2019-05-28 ENCOUNTER — TELEPHONE (OUTPATIENT)
Dept: OPHTHALMOLOGY | Facility: CLINIC | Age: 84
End: 2019-05-28

## 2019-05-28 NOTE — TELEPHONE ENCOUNTER
Patients daughter has questions about the [facility], I tranfered her to speak with Billie marques

## 2019-06-10 ENCOUNTER — TELEPHONE (OUTPATIENT)
Dept: FAMILY MEDICINE | Facility: CLINIC | Age: 84
End: 2019-06-10

## 2019-06-10 NOTE — TELEPHONE ENCOUNTER
----- Message from Danielle Jo sent at 6/10/2019  9:23 AM CDT -----  Type:  Needs Medical Advice    Who Called:  Pt  Son (Fei Muller)  Symptoms (please be specific):      How long has patient had these symptoms:     Pharmacy name and phone #:     Would the patient rather a call back or a response via [facility]?  Call back  Best Call Back Number:   [phone]  Or [phone]  Additional Information:  States he is calling to check on the dosage of pt's medication//Pravastatin//please call//thanks/lh

## 2019-06-10 NOTE — LETTER
Nicci 10, 2019    Fei Vicente Sr.  [address]  [address]  [facility] [address]             [facility]  [address] [address]  Phone: [phone]  Fax: [phone] Dear Mr. Vicente:    Referral Summary  5/24/2019  · [facility]  Location   Jump to Section ?  Printout Information    Document Contents Document Received Date Document Source Organization   Referral Summary May 24, 2019May 24, 2019 [facility]   Jules Enamorado C - 89 y.o. Male; born [birth year] Continuity of Care Document (C-CDA) (Encounter date: 05/03/2018 11:20 AM), generated on May 24, 2019May 24, 2019   Allergies, Adverse Reactions, Alerts    Substance Reaction Severity Status   doxycycline  Unknown Unknown Active   Penicillins    Unknown Active   Medications    Medication Instructions Dosage Effective Dates (start - stop) Status Comments   tamsulosin 0.4 mg capsule One CAPSULE QD (EVERY DAY) ORAL FOR 60 (SIXTY) DAYS   May- -  Active     finasteride 5 mg tablet TAKE 1 TABLET BY ORAL ROUTE EVERY DAY 5 MG May- -  Active     melatonin 1 mg Tab 1 (one) TABLET ORAL   Jul- -  Active     aspirin 325 mg Tab 1 (one) ORAL   Jul- -  Active     Paxil 10 mg Tab 1 (one) ORAL   Jul- -  Active     propranolol 40 mg Tab 1 (one) ORAL   Jul- -  Active     THERAGRAN-M 1 (one) ORAL   Jul- -  Active     pravastatin 20 mg tablet take 2 tablet by oral route every day 40 MG -  Active     Problems    Condition Effective Dates (start - stop) Clinical Status   BPH with obstruction Oct- -  Active   Procedures    Procedure Date   ASSAY OF URINE CREATININE May-   URINALYSIS, AUTO W/SCOPE May-   OFFICE/OUTPATIENT VISIT, EST May-   OFFICE/OUTPATIENT VISIT, EST May-   Results    Test Name Date and Time Measure Units Reference Range Abnormal Flag Comments   Unknown   Advance Directives    Directive  Yes / No Effective Date File Name   Other Directive No 3/31/2011 N/A   WARNING:The information contained in this section is historical and is provided for information only and does not constitute a legal document or any assurance that the information is still accurate. Please verify the information with the walker of the legal document before using it for clinical purposes.    Encounters    Encounter Description Practice Location Reason(s) For Visit Diagnoses Date Provider Care Team Members   OFFICE/OUTPATIENT VISIT, EST [facility], [employer], [id number],  tel:[phone] [facility] BPH (chief complaint) Observation Of Suspected Condition  Body mass index (BMI) 24.0-24.9, adult  Enlarged prostate with lower urinary tract symptoms May- Theron Segovia. [address], [address], [id number]. tel:[phone] Referring Provider: Luca Cameron, [address], [id number]. tel:[phone]     [facility], [address], [id number], [place] tel:[phone] [facility]   Enlarged prostate with lower urinary tract symptoms  Observation Of Suspected Condition Apr- Theron Segovia. [address], [place] [address], [place], [place], [id number]. tel:[phone] Referring Provider: Luca Cameron, [address], [id number]. tel:[phone]     [facility], [address] [place] [address], [place], [place], [id number], [place] tel:[phone] [facility]   Enlarged prostate with lower urinary tract symptoms  Observation Of Suspected Condition Mar-         [facility], [address] [place] [address], [id number], [place] tel:[phone] [facility] Office   Observation for other specified suspected conditions  HYPERTROPHY (BENIGN) OF PROSTATE WITH URINARY OBSTRUCTION  Oct-         THE [facility], [address] [facility] [address] [facility] [address], [place], [place], [id number], [place] tel:[phone] [facility]   HYPERTROPHY (BENIGN) OF PROSTATE WITH URINARY OBSTRUCTION Oct-         THE [facility], [address] [facility] [address] [address], [place], [place], [id number], [place] tel:[phone] [facility]   Observation for other specified suspected conditions  HYPERTROPHY (BENIGN) OF PROSTATE WITH URINARY OBSTRUCTION Oct-         [facility], [address] [facility] [address] [address], [place], [place], [id number], [place] tel:[phone] Conversion     Jul-         THE [facility], [address] [address], [place], [place], [id number], [place] tel:[phone] Conversion     Jul-       Family History    Family Member Diagnosis Age At Onset Status     Family history of Hypertension   N   Immunizations    Vaccine Date Status Comments   Pneumo (2 yrs or older)(PPV) Dec- completed - Completed reason: Other Provider    Influenza, injectable, split virus, preservative free, 3 years and older Afluria [id number] Nov- completed - Completed reason: Other Provider    Pneumo (2 yrs or older)(PPV) Nov- completed - Completed reason: Other Provider    Payers    Payer name Insurance type Covered party ID Authorization(s)   MEDICARE [place] [id number]     [facility] [id number]     Social History    Type Description Quantity Date Captured   Alcohol Use Details No   May-   Caffeine Use Details No   May-   Tobacco Use Status Ex-cigarette smoker   May-   Smoking Status Former smoker   May-   Smoking Tobacco Use Details Cigarette: Age Stopped: 38     Cigarette: 1 per day     May-   Vital Signs    Date / Time: Height Weight BMI Pulse Rate Blood Pressure Temperature Respiratory Rate Body Surface Area Head Circumference BMI percentile   May-/11:52:00 68.00 in 160.00 lbs 24.33  kg/meter(2) 60 /min 154/71 mm[Hg]     1.87 meter(2)       Chief Complaint And Reason For Visit   Most recent encounter only, dated '05/03/2018 11:20'. ***    BPH (chief complaint). Description: Pertinent negatives include chills, constipation, dysuria, fever, hematuria, urinary incontinence and urinary frequency.    Reason For Referral    Reason For Referral   Unknown   Plan Of Care    Date Type Action Status   May- Goal Dietary management education, guidance, and counseling completed   Oct- Goal Tobacco cessation counseling completed   Oct- Goal Tobacco cessation counseling completed   Oct- Goal Tobacco cessation counseling completed   Mar- Goal Tobacco cessation counseling completed     Date Type Problem Goal Intervention Status Start Date   Unknown.      History Of Present Illness    Encounter Date Complaint History Of Present Illness   May-       May-       May-       May-       May-       May- BPH Pertinent negatives include chills, constipation, dysuria, fever, hematuria, urinary incontinence and urinary frequency.   May- BPH 89-year-old gentleman who has had an excellent response for his lower urinary tract symptoms to combination Flomax and finasteride. He does not really get up at night very much at all. Force of urinary stream is acceptable and stable. At age 89 we mutually agreed that we will not screening for prostate cancer. Performance status remains good and he tolerates medications quite well.   Functional Status    Encounter Date Functional Assessment Cognitive Assessment   May- N/A Orientation - Oriented to time, place, person, situation.   Medications Administered    Medication Instructions Dosage Effective Dates (start - stop) Status Comments   Drug Treatment Unknown   Instructions    Date Instruction Additional Information   May- Dietary management education, guidance, and counseling Related to Body  mass index (BMI) 24.0-24.9, adult   Images  Patient Demographics    Patient Address Communication Language Race / Ethnicity   [place] [phone] (Mobile)  [email or web address] English (Preferred) White / Not  or    Document Information    Primary Care Provider Other Service Providers Document Coverage Dates   MD Lopez Torres (May 24, 2019May 24, 2019)  [address]  [place] [address]  MD Luca Crump  [address] [place] [address]  [address]  [place], [place] [id number]     Jul. 21, 2009July 21, 2009 - May 03, 2018May 03, 2018      Organization   [facility]  [id number] (Work)  [address] [place] [id number]       Legal Authenticator   Carissa Interface   [facility]  [id number] (Work)  [address] [place] [address]  [address] [place] [phone]    Show All Sections         If you have any questions or concerns, please don't hesitate to call.    Sincerely,        Bhavna Contreras LPN

## 2019-06-10 NOTE — TELEPHONE ENCOUNTER
Daughter in law called    [facility]   Pt thinks correct prevastatin med is wrong   Reports eric andrews prescribed a different dose     Pt would like notes from eric andrews     Family informed and info given

## 2019-06-26 ENCOUNTER — PROCEDURE VISIT (OUTPATIENT)
Dept: OPHTHALMOLOGY | Facility: CLINIC | Age: 84
End: 2019-06-26
Payer: MEDICARE

## 2019-06-26 DIAGNOSIS — H35.3231 EXUDATIVE AGE-RELATED MACULAR DEGENERATION OF BOTH EYES WITH ACTIVE CHOROIDAL NEOVASCULARIZATION: Primary | ICD-10-CM

## 2019-06-26 PROCEDURE — 92134 CPTRZ OPH DX IMG PST SGM RTA: CPT | Mod: PBBFAC | Performed by: OPHTHALMOLOGY

## 2019-06-26 PROCEDURE — 92134 POSTERIOR SEGMENT OCT RETINA (OCULAR COHERENCE TOMOGRAPHY)-BOTH EYES: ICD-10-PCS | Mod: 26,S$PBB,, | Performed by: OPHTHALMOLOGY

## 2019-06-26 PROCEDURE — 99499 UNLISTED E&M SERVICE: CPT | Mod: S$PBB,,, | Performed by: OPHTHALMOLOGY

## 2019-06-26 PROCEDURE — 99499 NO LOS: ICD-10-PCS | Mod: S$PBB,,, | Performed by: OPHTHALMOLOGY

## 2019-06-26 PROCEDURE — 67028 PR INJECT INTRAVITREAL PHARMCOLOGIC: ICD-10-PCS | Mod: 50,S$PBB,, | Performed by: OPHTHALMOLOGY

## 2019-06-26 PROCEDURE — 67028 INJECTION EYE DRUG: CPT | Mod: 50,PBBFAC | Performed by: OPHTHALMOLOGY

## 2019-06-26 PROCEDURE — 67028 INJECTION EYE DRUG: CPT | Mod: 50,S$PBB,, | Performed by: OPHTHALMOLOGY

## 2019-06-26 RX ADMIN — AFLIBERCEPT 2 MG: 40 INJECTION, SOLUTION INTRAVITREAL at 01:06

## 2019-06-26 NOTE — PROGRESS NOTES
===============================  06/26/2019   Fei Vicente Sr.,   89 y.o. male   Last visit [facility]: :5/24/2019   [facility] de[facility]  Uncorrected distance visual acuity was 20/60 +1 in the right eye and 20/200 in the left eye.   Not recorded         Not recorded         Not recorded        Chief Complaint   Patient presents with    SRN     Pt here for Eylea OU. pt states no problems since his last injection. has drops from the last visit.         HPI     SRN      Additional comments: Pt here for Eylea OU. pt states no problems since   his last injection. has drops from the last visit.               Comments     Bilateral SRN  Previously treated at [facility] by Dr. Amara GOMESLEA OU 5/24/19          Last edited by Audie Baig on 6/26/2019 11:00 AM. (History)          ________________  6/26/2019  Problem List Items Addressed This Visit        Eye/Vision problems    Exudative age-related macular degeneration of both eyes with active choroidal neovascularization - Primary    Relevant Medications    aflibercept Soln 2 mg (Completed) (Start on 6/26/2019  1:15 PM)    aflibercept Soln 2 mg (Completed) (Start on 6/26/2019  1:15 PM)    Other Relevant Orders    Prior Authorization Order    Posterior Segment OCT Retina-Both eyes (Completed)        Os over these monbths c;ear;y better    .os better  od very stable rtc 6 qweeks    tx ou     6/26/2019  Diagnosis :  Ou srn   Today:   Eylea (afibercept) 2 mg/0.05 ml Intravitreal Injection , OU   Follow up: rtc 6 weeks        Instructed to call 24/7 for any worsening of vision. Check Both eyes daily. Gave patient my home phone number.      Procedure  Note:   OU}  Eylea (afibercept) 2 mg/0.05 ml Intravitreal Injection    I have explained the Risks, Benefits and Alternatives of the procedure in detail.  The patient voices understanding and all questions have been answered.  The patient agrees to proceed as discussed.  Xylocaine with Epi 2%  subconj bleb   was used for anesthesia.  Topical betadine was used for antisepsis.  0.05 cc was  injected 3.7 mm from corneal limbus in the inferotemporal quadrant.  Following injection the IOP was less than thirty (<30) by tonopen.  The eye was then thoroughly irrigated with BSS.  Patient tolerated procedure well.  No complications were observed.  The Patient was educated that mild irritation tonight was normal secondary to topical antispsis use.  Pt was advised to call at any time day or night for pain, redness, or any decline in vision. I gave the patient my home number as well as the clinic on call number. Daily visual checks and Amsler grid testing were reviewed.  ciloxan Antibiotic Drops to be used 4 times daily for 4 days  PAUL Delgadillo MD  Procedure ordered: y  Consent: y  Pre auth: y  MAR:y  Opnote: y  Charge capture:y           ===========================

## 2019-08-13 ENCOUNTER — PATIENT MESSAGE (OUTPATIENT)
Dept: OPHTHALMOLOGY | Facility: CLINIC | Age: 84
End: 2019-08-13

## 2019-08-14 ENCOUNTER — PROCEDURE VISIT (OUTPATIENT)
Dept: OPHTHALMOLOGY | Facility: CLINIC | Age: 84
End: 2019-08-14
Payer: MEDICARE

## 2019-08-14 DIAGNOSIS — H35.3231 EXUDATIVE AGE-RELATED MACULAR DEGENERATION OF BOTH EYES WITH ACTIVE CHOROIDAL NEOVASCULARIZATION: Primary | ICD-10-CM

## 2019-08-14 PROCEDURE — 67028 INJECTION EYE DRUG: CPT | Mod: S$PBB,RT,, | Performed by: OPHTHALMOLOGY

## 2019-08-14 PROCEDURE — 92134 CPTRZ OPH DX IMG PST SGM RTA: CPT | Mod: PBBFAC | Performed by: OPHTHALMOLOGY

## 2019-08-14 PROCEDURE — 67028 PR INJECT INTRAVITREAL PHARMCOLOGIC: ICD-10-PCS | Mod: S$PBB,RT,, | Performed by: OPHTHALMOLOGY

## 2019-08-14 PROCEDURE — 92134 POSTERIOR SEGMENT OCT RETINA (OCULAR COHERENCE TOMOGRAPHY)-BOTH EYES: ICD-10-PCS | Mod: 26,S$PBB,, | Performed by: OPHTHALMOLOGY

## 2019-08-14 PROCEDURE — 99499 NO LOS: ICD-10-PCS | Mod: S$PBB,,, | Performed by: OPHTHALMOLOGY

## 2019-08-14 PROCEDURE — 99499 UNLISTED E&M SERVICE: CPT | Mod: S$PBB,,, | Performed by: OPHTHALMOLOGY

## 2019-08-14 PROCEDURE — 67028 INJECTION EYE DRUG: CPT | Mod: PBBFAC,RT | Performed by: OPHTHALMOLOGY

## 2019-08-14 RX ORDER — CIPROFLOXACIN HYDROCHLORIDE 3 MG/ML
SOLUTION/ DROPS OPHTHALMIC
Qty: 5 ML | Refills: 0 | Status: SHIPPED | OUTPATIENT
Start: 2019-08-14 | End: 2019-08-21

## 2019-08-14 RX ADMIN — AFLIBERCEPT 2 MG: 40 INJECTION, SOLUTION INTRAVITREAL at 09:08

## 2019-08-14 NOTE — PROGRESS NOTES
===============================  Fei Vicente Sr.,   89 y.o. male   Last visit JCC: :6/26/2019   Last visit eye dept. 6/26/2019  [facility]:  Uncorrected distance visual acuity was 20/60 in the right eye and 20/400 in the left eye.   Not recorded         Not recorded         Not recorded         Not recorded        Chief Complaint   Patient presents with    Macular Degeneration     eylea od          ________________  8/14/2019  HPI     Macular Degeneration      Additional comments: eylea od              Comments     Bilateral SRN  Previously treated at [facility] by Dr. Amara MILAN OU 6/26/19          Last edited by Claudia Chin on 8/14/2019  9:56 AM. (History)      Problem List Items Addressed This Visit        Eye/Vision problems    Exudative age-related macular degeneration of both eyes with active choroidal neovascularization - Primary    Relevant Medications    aflibercept Soln 2 mg (Completed)    ciprofloxacin HCl (CILOXAN) 0.3 % ophthalmic solution    Other Relevant Orders    Posterior Segment OCT Retina-Both eyes    Prior Authorization Order        8 mon ths of tx   gaurang cunningham at 6 weeks txx   rtc 8 weks tx od onl;y going forward  And carefully follo wos      .     od srn    8/14/2019  Diagnosis :  od srn follow os  Today:   Eylea (afibercept) 2 mg/0.05 ml Intravitreal Injection , OD   Follow up: rtc 8 weeks    Instructed to call 24/7 for any worsening of vision. Check Both eyes daily. Gave patient my home phone number.    Procedure  Note:   OD}  Eylea (afibercept) 2 mg/0.05 ml Intravitreal Injection    I have explained the Risks, Benefits and Alternatives of the procedure in detail.  The patient voices understanding and all questions have been answered.  The patient agrees to proceed as discussed.  Xylocaine with Epi 2%  subconj bleb  was used for anesthesia.  Topical betadine was used for antisepsis.  0.05 cc was  injected 3.7 mm from corneal limbus in the inferotemporal quadrant.  Following  injection the IOP was less than thirty (<30) by tonopen.  The eye was then thoroughly irrigated with BSS.  Patient tolerated procedure well.  No complications were observed.  The Patient was educated that mild irritation tonight was normal secondary to topical antispsis use.  Pt was advised to call at any time day or night for pain, redness, or any decline in vision. I gave the patient my home number as well as the clinic on call number. Daily visual checks and Amsler grid testing were reviewed.  ciloxan Antibiotic Drops to be used 4 times daily for 4 days  PAUL Delgadillo MD  Procedure ordered: y  Consent: y  Pre auth: y  MAR:y  Opnote: y  Charge capture:y        ===========================

## 2019-08-26 ENCOUNTER — LAB VISIT (OUTPATIENT)
Dept: LAB | Facility: HOSPITAL | Age: 84
End: 2019-08-26
Attending: INTERNAL MEDICINE
Payer: MEDICARE

## 2019-08-26 ENCOUNTER — OFFICE VISIT (OUTPATIENT)
Dept: FAMILY MEDICINE | Facility: CLINIC | Age: 84
End: 2019-08-26
Payer: MEDICARE

## 2019-08-26 VITALS
SYSTOLIC BLOOD PRESSURE: 130 MMHG | HEART RATE: 78 BPM | TEMPERATURE: 98 F | BODY MASS INDEX: 25.63 KG/M2 | WEIGHT: 168.56 LBS | DIASTOLIC BLOOD PRESSURE: 68 MMHG | OXYGEN SATURATION: 96 %

## 2019-08-26 DIAGNOSIS — K62.89 RECTAL DISCOMFORT: Primary | ICD-10-CM

## 2019-08-26 DIAGNOSIS — D47.2 MGUS (MONOCLONAL GAMMOPATHY OF UNKNOWN SIGNIFICANCE): ICD-10-CM

## 2019-08-26 LAB
ALBUMIN SERPL BCP-MCNC: 3.7 G/DL (ref 3.5–5.2)
ALP SERPL-CCNC: 62 U/L (ref 55–135)
ALT SERPL W/O P-5'-P-CCNC: 10 U/L (ref 10–44)
ANION GAP SERPL CALC-SCNC: 7 MMOL/L (ref 8–16)
AST SERPL-CCNC: 15 U/L (ref 10–40)
B2 MICROGLOB SERPL-MCNC: 1.7 UG/ML (ref 0–2.5)
BASOPHILS # BLD AUTO: 0.01 K/UL (ref 0–0.2)
BASOPHILS NFR BLD: 0.2 % (ref 0–1.9)
BILIRUB SERPL-MCNC: 0.3 MG/DL (ref 0.1–1)
BUN SERPL-MCNC: 21 MG/DL (ref 10–30)
CALCIUM SERPL-MCNC: 10.1 MG/DL (ref 8.7–10.5)
CHLORIDE SERPL-SCNC: 102 MMOL/L (ref 95–110)
CO2 SERPL-SCNC: 27 MMOL/L (ref 23–29)
CREAT SERPL-MCNC: 1.1 MG/DL (ref 0.5–1.4)
DIFFERENTIAL METHOD: ABNORMAL
EOSINOPHIL # BLD AUTO: 0.3 K/UL (ref 0–0.5)
EOSINOPHIL NFR BLD: 5.4 % (ref 0–8)
ERYTHROCYTE [DISTWIDTH] IN BLOOD BY AUTOMATED COUNT: 12.4 % (ref 11.5–14.5)
EST. GFR  (AFRICAN AMERICAN): >60 ML/MIN/1.73 M^2
EST. GFR  (NON AFRICAN AMERICAN): 57.2 ML/MIN/1.73 M^2
GLUCOSE SERPL-MCNC: 109 MG/DL (ref 70–110)
HCT VFR BLD AUTO: 37.7 % (ref 40–54)
HGB BLD-MCNC: 12.3 G/DL (ref 14–18)
IMM GRANULOCYTES # BLD AUTO: 0.01 K/UL (ref 0–0.04)
IMM GRANULOCYTES NFR BLD AUTO: 0.2 % (ref 0–0.5)
LYMPHOCYTES # BLD AUTO: 1.8 K/UL (ref 1–4.8)
LYMPHOCYTES NFR BLD: 30.6 % (ref 18–48)
MCH RBC QN AUTO: 34.5 PG (ref 27–31)
MCHC RBC AUTO-ENTMCNC: 32.6 G/DL (ref 32–36)
MCV RBC AUTO: 106 FL (ref 82–98)
MONOCYTES # BLD AUTO: 0.7 K/UL (ref 0.3–1)
MONOCYTES NFR BLD: 11.1 % (ref 4–15)
NEUTROPHILS # BLD AUTO: 3.1 K/UL (ref 1.8–7.7)
NEUTROPHILS NFR BLD: 52.5 % (ref 38–73)
NRBC BLD-RTO: 0 /100 WBC
PLATELET # BLD AUTO: 72 K/UL (ref 150–350)
PMV BLD AUTO: 11.1 FL (ref 9.2–12.9)
POTASSIUM SERPL-SCNC: 4.9 MMOL/L (ref 3.5–5.1)
PROT SERPL-MCNC: 5.9 G/DL (ref 6–8.4)
RBC # BLD AUTO: 3.57 M/UL (ref 4.6–6.2)
SODIUM SERPL-SCNC: 136 MMOL/L (ref 136–145)
WBC # BLD AUTO: 5.95 K/UL (ref 3.9–12.7)

## 2019-08-26 PROCEDURE — 82232 ASSAY OF BETA-2 PROTEIN: CPT

## 2019-08-26 PROCEDURE — 84165 PROTEIN E-PHORESIS SERUM: CPT

## 2019-08-26 PROCEDURE — 36415 COLL VENOUS BLD VENIPUNCTURE: CPT | Mod: PO

## 2019-08-26 PROCEDURE — 99999 PR PBB SHADOW E&M-EST. PATIENT-LVL III: ICD-10-PCS | Mod: PBBFAC,,, | Performed by: INTERNAL MEDICINE

## 2019-08-26 PROCEDURE — 99213 OFFICE O/P EST LOW 20 MIN: CPT | Mod: PBBFAC,PO | Performed by: INTERNAL MEDICINE

## 2019-08-26 PROCEDURE — 84165 PATHOLOGIST INTERPRETATION SPE: ICD-10-PCS | Mod: 26,,, | Performed by: PATHOLOGY

## 2019-08-26 PROCEDURE — 85025 COMPLETE CBC W/AUTO DIFF WBC: CPT

## 2019-08-26 PROCEDURE — 99213 OFFICE O/P EST LOW 20 MIN: CPT | Mod: S$PBB,,, | Performed by: INTERNAL MEDICINE

## 2019-08-26 PROCEDURE — 84165 PROTEIN E-PHORESIS SERUM: CPT | Mod: 26,,, | Performed by: PATHOLOGY

## 2019-08-26 PROCEDURE — 99213 PR OFFICE/OUTPT VISIT, EST, LEVL III, 20-29 MIN: ICD-10-PCS | Mod: S$PBB,,, | Performed by: INTERNAL MEDICINE

## 2019-08-26 PROCEDURE — 80053 COMPREHEN METABOLIC PANEL: CPT

## 2019-08-26 PROCEDURE — 99999 PR PBB SHADOW E&M-EST. PATIENT-LVL III: CPT | Mod: PBBFAC,,, | Performed by: INTERNAL MEDICINE

## 2019-08-26 PROCEDURE — 83520 IMMUNOASSAY QUANT NOS NONAB: CPT | Mod: 59

## 2019-08-26 RX ORDER — CYANOCOBALAMIN (VITAMIN B-12) 500 MCG
TABLET ORAL
COMMUNITY
Start: 2010-07-27 | End: 2019-08-26

## 2019-08-26 RX ORDER — PROPRANOLOL HYDROCHLORIDE 40 MG/1
TABLET ORAL
COMMUNITY
Start: 2009-07-21

## 2019-08-26 RX ORDER — FINASTERIDE 5 MG/1
1 TABLET, FILM COATED ORAL
COMMUNITY
Start: 2018-05-03 | End: 2019-08-26 | Stop reason: SDUPTHER

## 2019-08-26 RX ORDER — HYDROCORTISONE ACETATE 25 MG/1
25 SUPPOSITORY RECTAL 2 TIMES DAILY
Qty: 20 SUPPOSITORY | Refills: 1 | Status: SHIPPED | OUTPATIENT
Start: 2019-08-26 | End: 2019-09-05

## 2019-08-26 RX ORDER — TAMSULOSIN HYDROCHLORIDE 0.4 MG/1
CAPSULE ORAL
COMMUNITY
Start: 2018-05-03 | End: 2019-08-26 | Stop reason: SDUPTHER

## 2019-08-26 NOTE — PROGRESS NOTES
Subjective:       Patient ID: Fei Vicente Sr. is a 89 y.o. male.    Chief Complaint: Hemorrhoids    Here with son------------- 1 month--------? Feeling or internal hemorrhoids--------told had internal hemorrhoids by gi in the past and given anusol supp to take.         Started taking them about 1 month ago every day or so because concerned might have developed cancer in the area----------although he is having no worsening symptoms or change in stools---states he has had thin stools for years-------    Past Medical History:   Diagnosis Date    Arthritis     Macular degeneration     Retinal detachment      Past Surgical History:   Procedure Laterality Date    CATARACT EXTRACTION      RETINAL DETACHMENT SURGERY       No family history on file.  Social History     Socioeconomic History    Marital status:      Spouse name: Not on file    Number of children: Not on file    Years of education: Not on file    Highest education level: Not on file   Occupational History    Not on file   Social Needs    Financial resource strain: Not on file    Food insecurity:     Worry: Not on file     Inability: Not on file    Transportation needs:     Medical: Not on file     Non-medical: Not on file   Tobacco Use    Smoking status: Never Smoker    Smokeless tobacco: Never Used   Substance and Sexual Activity    Alcohol use: Not on file    Drug use: Not on file    Sexual activity: Not on file   Lifestyle    Physical activity:     Days per week: Not on file     Minutes per session: Not on file    Stress: Not on file   Relationships    Social connections:     Talks on phone: Not on file     Gets together: Not on file     Attends Orthodox service: Not on file     Active member of club or organization: Not on file     Attends meetings of clubs or organizations: Not on file     Relationship status: Not on file   Other Topics Concern    Not on file   Social History Narrative    Not on file     Review of Systems    Constitutional: Negative for chills and fever.   HENT: Negative.    Respiratory: Negative for apnea, cough, choking, chest tightness, shortness of breath, wheezing and stridor.    Cardiovascular: Negative for chest pain and palpitations.   Gastrointestinal: Negative for abdominal pain, nausea and vomiting.   Genitourinary: Negative for dysuria, hematuria and urgency.   Neurological: Negative for dizziness, weakness, light-headedness and headaches.   Psychiatric/Behavioral: Negative for agitation, behavioral problems and confusion.       Objective:      Physical Exam   Constitutional: He is oriented to person, place, and time. He appears well-developed and well-nourished.   Cardiovascular: Normal rate, regular rhythm, normal heart sounds and intact distal pulses.   Pulmonary/Chest: Effort normal and breath sounds normal.   Abdominal: Soft. Bowel sounds are normal.   Genitourinary: Rectal exam shows guaiac negative stool.   Genitourinary Comments: Tissue left rectum   Neurological: He is alert and oriented to person, place, and time.   Nursing note and vitals reviewed.      CMP  Sodium   Date Value Ref Range Status   05/01/2019 137 136 - 145 mmol/L Final     Potassium   Date Value Ref Range Status   05/01/2019 4.1 3.5 - 5.1 mmol/L Final     Chloride   Date Value Ref Range Status   05/01/2019 105 95 - 110 mmol/L Final     CO2   Date Value Ref Range Status   05/01/2019 26 23 - 29 mmol/L Final     Glucose   Date Value Ref Range Status   05/01/2019 83 70 - 110 mg/dL Final     BUN, Bld   Date Value Ref Range Status   05/01/2019 17 10 - 30 mg/dL Final     Creatinine   Date Value Ref Range Status   05/01/2019 0.9 0.5 - 1.4 mg/dL Final     Calcium   Date Value Ref Range Status   05/01/2019 10.5 8.7 - 10.5 mg/dL Final     Total Protein   Date Value Ref Range Status   05/01/2019 5.7 (L) 6.0 - 8.4 g/dL Final     Albumin   Date Value Ref Range Status   05/01/2019 3.6 3.5 - 5.2 g/dL Final     Total Bilirubin   Date Value Ref  Range Status   05/01/2019 0.5 0.1 - 1.0 mg/dL Final     Comment:     For infants and newborns, interpretation of results should be based  on gestational age, weight and in agreement with clinical  observations.  Premature Infant recommended reference ranges:  Up to 24 hours.............<8.0 mg/dL  Up to 48 hours............<12.0 mg/dL  3-5 days..................<15.0 mg/dL  6-29 days.................<15.0 mg/dL       Alkaline Phosphatase   Date Value Ref Range Status   05/01/2019 62 55 - 135 U/L Final     AST   Date Value Ref Range Status   05/01/2019 16 10 - 40 U/L Final     ALT   Date Value Ref Range Status   05/01/2019 12 10 - 44 U/L Final     Anion Gap   Date Value Ref Range Status   05/01/2019 6 (L) 8 - 16 mmol/L Final     eGFR if    Date Value Ref Range Status   05/01/2019 >60 >60 mL/min/1.73 m^2 Final     eGFR if non    Date Value Ref Range Status   05/01/2019 >60 >60 mL/min/1.73 m^2 Final     Comment:     Calculation used to obtain the estimated glomerular filtration  rate (eGFR) is the CKD-EPI equation.        Lab Results   Component Value Date    WBC 5.87 05/01/2019    HGB 12.5 (L) 05/01/2019    HCT 38.3 (L) 05/01/2019     (H) 05/01/2019    PLT 88 (L) 05/01/2019     Lab Results   Component Value Date    CHOL 181 11/19/2018     Lab Results   Component Value Date    HDL 38 (L) 11/19/2018     Lab Results   Component Value Date    LDLCALC 110.6 11/19/2018     Lab Results   Component Value Date    TRIG 162 (H) 11/19/2018     Lab Results   Component Value Date    CHOLHDL 21.0 11/19/2018     Lab Results   Component Value Date    TSH 1.602 01/08/2019     No results found for: LABA1C, HGBA1C  Assessment:       1. Rectal discomfort        Plan:   Rectal discomfort    Other orders  -     hydrocortisone (ANUSOL-HC) 25 mg suppository; Place 1 suppository (25 mg total) rectally 2 (two) times daily. for 10 days  Dispense: 20 suppository; Refill: 1    will get his oncology labs  today.          He will call if wants referral to gi.    F/u 1 month.

## 2019-08-27 LAB
ALBUMIN SERPL ELPH-MCNC: 3.74 G/DL (ref 3.35–5.55)
ALPHA1 GLOB SERPL ELPH-MCNC: 0.24 G/DL (ref 0.17–0.41)
ALPHA2 GLOB SERPL ELPH-MCNC: 0.59 G/DL (ref 0.43–0.99)
B-GLOBULIN SERPL ELPH-MCNC: 0.59 G/DL (ref 0.5–1.1)
GAMMA GLOB SERPL ELPH-MCNC: 0.45 G/DL (ref 0.67–1.58)
KAPPA LC SER QL IA: 1.38 MG/DL (ref 0.33–1.94)
KAPPA LC/LAMBDA SER IA: 0.03 (ref 0.26–1.65)
LAMBDA LC SER QL IA: 46.55 MG/DL (ref 0.57–2.63)
PATHOLOGIST INTERPRETATION SPE: NORMAL
PROT SERPL-MCNC: 5.6 G/DL (ref 6–8.4)

## 2019-09-19 ENCOUNTER — TELEPHONE (OUTPATIENT)
Dept: FAMILY MEDICINE | Facility: CLINIC | Age: 84
End: 2019-09-19

## 2019-09-19 ENCOUNTER — OFFICE VISIT (OUTPATIENT)
Dept: HEMATOLOGY/ONCOLOGY | Facility: CLINIC | Age: 84
End: 2019-09-19
Payer: MEDICARE

## 2019-09-19 ENCOUNTER — DOCUMENTATION ONLY (OUTPATIENT)
Dept: HEMATOLOGY/ONCOLOGY | Facility: CLINIC | Age: 84
End: 2019-09-19

## 2019-09-19 VITALS
RESPIRATION RATE: 18 BRPM | WEIGHT: 166.69 LBS | BODY MASS INDEX: 25.26 KG/M2 | DIASTOLIC BLOOD PRESSURE: 68 MMHG | HEART RATE: 78 BPM | OXYGEN SATURATION: 93 % | HEIGHT: 68 IN | SYSTOLIC BLOOD PRESSURE: 138 MMHG | TEMPERATURE: 98 F

## 2019-09-19 DIAGNOSIS — D47.2 MGUS (MONOCLONAL GAMMOPATHY OF UNKNOWN SIGNIFICANCE): Primary | ICD-10-CM

## 2019-09-19 PROCEDURE — 99213 OFFICE O/P EST LOW 20 MIN: CPT | Mod: PBBFAC,25 | Performed by: INTERNAL MEDICINE

## 2019-09-19 PROCEDURE — 99999 PR PBB SHADOW E&M-EST. PATIENT-LVL III: ICD-10-PCS | Mod: PBBFAC,,, | Performed by: INTERNAL MEDICINE

## 2019-09-19 PROCEDURE — 99214 OFFICE O/P EST MOD 30 MIN: CPT | Mod: S$PBB,,, | Performed by: INTERNAL MEDICINE

## 2019-09-19 PROCEDURE — 99999 PR PBB SHADOW E&M-EST. PATIENT-LVL III: CPT | Mod: PBBFAC,,, | Performed by: INTERNAL MEDICINE

## 2019-09-19 PROCEDURE — 90662 IIV NO PRSV INCREASED AG IM: CPT | Mod: PBBFAC

## 2019-09-19 PROCEDURE — 99214 PR OFFICE/OUTPT VISIT, EST, LEVL IV, 30-39 MIN: ICD-10-PCS | Mod: S$PBB,,, | Performed by: INTERNAL MEDICINE

## 2019-09-19 NOTE — PROGRESS NOTES
Subjective:       Patient ID: Fei Vicente Sr. is a 89 y.o. male.    Chief Complaint: Follow-up; Results; and Lymphoma    HPI 89-year-old male history monoclonal gammopathy undetermined significance with elevated free light chain patient returns for review some present status 2    Past Medical History:   Diagnosis Date    Arthritis     Macular degeneration     Retinal detachment      History reviewed. No pertinent family history.  Social History     Socioeconomic History    Marital status:      Spouse name: Not on file    Number of children: Not on file    Years of education: Not on file    Highest education level: Not on file   Occupational History    Not on file   Social Needs    Financial resource strain: Not on file    Food insecurity:     Worry: Not on file     Inability: Not on file    Transportation needs:     Medical: Not on file     Non-medical: Not on file   Tobacco Use    Smoking status: Never Smoker    Smokeless tobacco: Never Used   Substance and Sexual Activity    Alcohol use: Not on file    Drug use: Not on file    Sexual activity: Not on file   Lifestyle    Physical activity:     Days per week: Not on file     Minutes per session: Not on file    Stress: Not on file   Relationships    Social connections:     Talks on phone: Not on file     Gets together: Not on file     Attends Pentec[employer]alism service: Not on file     Active member of club or organization: Not on file     Attends meetings of clubs or organizations: Not on file     Relationship status: Not on file   Other Topics Concern    Not on file   Social History Narrative    Not on file     Past Surgical History:   Procedure Laterality Date    CATARACT EXTRACTION      RETINAL DETACHMENT SURGERY         Labs:  Lab Results   Component Value Date    WBC 5.95 08/26/2019    HGB 12.3 (L) 08/26/2019    HCT 37.7 (L) 08/26/2019     (H) 08/26/2019    PLT 72 (L) 08/26/2019     BMP  Lab Results   Component Value Date      08/26/2019    K 4.9 08/26/2019     08/26/2019    CO2 27 08/26/2019    BUN 21 08/26/2019    CREATININE 1.1 08/26/2019    CALCIUM 10.1 08/26/2019    ANIONGAP 7 (L) 08/26/2019    ESTGFRAFRICA >60.0 08/26/2019    EGFRNONAA 57.2 (A) 08/26/2019     Lab Results   Component Value Date    ALT 10 08/26/2019    AST 15 08/26/2019    ALKPHOS 62 08/26/2019    BILITOT 0.3 08/26/2019       Lab Results   Component Value Date    IRON 98 01/08/2019    TIBC 377 01/08/2019    FERRITIN 32 01/08/2019     Lab Results   Component Value Date    MSIBEZYI26 1532 (H) 01/08/2019     No results found for: FOLATE  Lab Results   Component Value Date    TSH 1.602 01/08/2019         Review of Systems   Constitutional: Positive for fatigue. Negative for activity change, appetite change, chills, diaphoresis, fever and unexpected weight change.   HENT: Negative for congestion, dental problem, drooling, ear discharge, ear pain, facial swelling, hearing loss, mouth sores, nosebleeds, postnasal drip, rhinorrhea, sinus pressure, sneezing, sore throat, tinnitus, trouble swallowing and voice change.    Eyes: Negative for photophobia, pain, discharge, redness, itching and visual disturbance.   Respiratory: Negative for apnea, cough, choking, chest tightness, shortness of breath, wheezing and stridor.    Cardiovascular: Negative for chest pain, palpitations and leg swelling.   Gastrointestinal: Negative for abdominal distention, abdominal pain, anal bleeding, blood in stool, constipation, diarrhea, nausea, rectal pain and vomiting.   Endocrine: Negative for cold intolerance, heat intolerance, polydipsia, polyphagia and polyuria.   Genitourinary: Negative for decreased urine volume, difficulty urinating, discharge, dysuria, enuresis, flank pain, frequency, genital sores, hematuria, penile pain, penile swelling, scrotal swelling, testicular pain and urgency.   Musculoskeletal: Positive for gait problem. Negative for arthralgias, back pain, joint swelling,  myalgias, neck pain and neck stiffness.   Skin: Negative for color change, pallor, rash and wound.   Allergic/Immunologic: Negative for environmental allergies, food allergies and immunocompromised state.   Neurological: Positive for weakness. Negative for dizziness, tremors, seizures, syncope, facial asymmetry, speech difficulty, light-headedness, numbness and headaches.   Hematological: Negative for adenopathy. Does not bruise/bleed easily.   Psychiatric/Behavioral: Negative for agitation, behavioral problems, confusion, decreased concentration, dysphoric mood, hallucinations, self-injury, sleep disturbance and suicidal ideas. The patient is not nervous/anxious and is not hyperactive.        Objective:      Physical Exam   Constitutional: He is oriented to person, place, and time. He appears well-developed and well-nourished. He appears distressed.   HENT:   Head: Normocephalic.   Right Ear: External ear normal.   Left Ear: External ear normal.   Nose: Nose normal. Right sinus exhibits no maxillary sinus tenderness and no frontal sinus tenderness. Left sinus exhibits no maxillary sinus tenderness and no frontal sinus tenderness.   Mouth/Throat: Oropharynx is clear and moist. No oropharyngeal exudate.   Eyes: Pupils are equal, round, and reactive to light. EOM and lids are normal. Right eye exhibits no discharge. Left eye exhibits no discharge. Right conjunctiva is not injected. Right conjunctiva has no hemorrhage. Left conjunctiva is not injected. Left conjunctiva has no hemorrhage. No scleral icterus. Right eye exhibits normal extraocular motion. Left eye exhibits normal extraocular motion.   Neck: Normal range of motion. Neck supple. No JVD present. No tracheal deviation present. No thyromegaly present.   Cardiovascular: Normal rate and regular rhythm.   Pulmonary/Chest: Effort normal. No stridor. No respiratory distress.   Abdominal: Soft. He exhibits no mass. There is no hepatosplenomegaly, splenomegaly or  hepatomegaly. There is no tenderness.   Musculoskeletal: Normal range of motion. He exhibits no edema or tenderness.   Lymphadenopathy:        Head (right side): No posterior auricular and no occipital adenopathy present.        Head (left side): No posterior auricular and no occipital adenopathy present.     He has no cervical adenopathy.        Right cervical: No superficial cervical, no deep cervical and no posterior cervical adenopathy present.       Left cervical: No superficial cervical, no deep cervical and no posterior cervical adenopathy present.     He has no axillary adenopathy.        Right: No supraclavicular adenopathy present.        Left: No supraclavicular adenopathy present.   Neurological: He is alert and oriented to person, place, and time. He has normal strength. No cranial nerve deficit. Coordination normal.   Skin: Skin is dry. No rash noted. He is not diaphoretic. No cyanosis or erythema. Nails show no clubbing.   Psychiatric: He has a normal mood and affect. His behavior is normal. Judgment and thought content normal. Cognition and memory are normal.   Vitals reviewed.          Assessment:      1. MGUS (monoclonal gammopathy of unknown significance)           Plan:     Monoclonal gammopathy undetermined significance with elevated free light chain stable over date month.  Time to q.6 months.  Flu shot given recommended for shingles vaccination both he and son present discussed implications        Ashok Saravia Jr, MD FACP

## 2019-09-19 NOTE — TELEPHONE ENCOUNTER
----- Message from Yi Davidson sent at 9/19/2019 11:01 AM CDT -----  Contact: son-leonardo  Type:  Needs Medical Advice    Who Called: son  Symptoms (please be specific):n/a   How long has patient had these symptoms: n/a  Pharmacy name and phone #: n/a  Would the patient rather a call back or a response via [facility]? Call back  Best Call Back Number: [phone]  Additional Information: requesting call back regarding questions about pt upcoming appt.      Thanks,  Yi Davidson

## 2019-09-19 NOTE — PROGRESS NOTES
Injection given without difficulties. Band aid applied. Patient instructed to wait 15 minutes after injection and to notified nurse of any reactions. Verbalized understanding.

## 2019-09-19 NOTE — TELEPHONE ENCOUNTER
Pt has appt schedule scheduled on Monday f  Explained to pt that this is a check up from last vist 1 month ago

## 2019-10-09 ENCOUNTER — PROCEDURE VISIT (OUTPATIENT)
Dept: OPHTHALMOLOGY | Facility: CLINIC | Age: 84
End: 2019-10-09
Payer: MEDICARE

## 2019-10-09 DIAGNOSIS — H35.3211 EXUDATIVE AGE-RELATED MACULAR DEGENERATION OF RIGHT EYE WITH ACTIVE CHOROIDAL NEOVASCULARIZATION: ICD-10-CM

## 2019-10-09 DIAGNOSIS — H35.3231 EXUDATIVE AGE-RELATED MACULAR DEGENERATION OF BOTH EYES WITH ACTIVE CHOROIDAL NEOVASCULARIZATION: Primary | ICD-10-CM

## 2019-10-09 PROCEDURE — 92012 INTRM OPH EXAM EST PATIENT: CPT | Mod: S$PBB,,, | Performed by: OPHTHALMOLOGY

## 2019-10-09 PROCEDURE — 92134 POSTERIOR SEGMENT OCT RETINA (OCULAR COHERENCE TOMOGRAPHY)-BOTH EYES: ICD-10-PCS | Mod: 26,S$PBB,, | Performed by: OPHTHALMOLOGY

## 2019-10-09 PROCEDURE — 92012 PR EYE EXAM, EST PATIENT,INTERMED: ICD-10-PCS | Mod: S$PBB,,, | Performed by: OPHTHALMOLOGY

## 2019-10-09 PROCEDURE — 92134 CPTRZ OPH DX IMG PST SGM RTA: CPT | Mod: PBBFAC | Performed by: OPHTHALMOLOGY

## 2019-10-09 NOTE — PROGRESS NOTES
===============================  Fei Vicente Sr.,   89 y.o. male   Last visit JC: :8/14/2019   Last visit eye dept. 8/14/2019  [facility]:  Uncorrected distance visual acuity was 20/60 in the right eye and 20/400 in the left eye.  Tonometry     Tonometry (Applanation, 11:00 AM)       Right Left    Pressure 13 13               Not recorded         Not recorded         Not recorded        Chief Complaint   Patient presents with    srn     eylea od          ________________  10/9/2019  HPI     srn      Additional comments: eylea od              Comments     Bilateral SRN  Previously treated at [facility] by Dr. Amara MILAN OU 6/26/19  Eylea od 8/14/19          Last edited by BRADFORD Santos on 10/9/2019  9:33 AM. (History)      Problem List Items Addressed This Visit        Eye/Vision problems    Exudative age-related macular degeneration of right eye with active choroidal neovascularization - Primary      srn ou  lowlying trd os  Today eylea od / following os, no injection os today  rtc 8 weeks od eylea            .       ===========================

## 2019-12-04 ENCOUNTER — PROCEDURE VISIT (OUTPATIENT)
Dept: OPHTHALMOLOGY | Facility: CLINIC | Age: 84
End: 2019-12-04
Payer: MEDICARE

## 2019-12-04 DIAGNOSIS — H35.3211 EXUDATIVE AGE-RELATED MACULAR DEGENERATION OF RIGHT EYE WITH ACTIVE CHOROIDAL NEOVASCULARIZATION: Primary | ICD-10-CM

## 2019-12-04 PROCEDURE — 99499 NO LOS: ICD-10-PCS | Mod: S$PBB,,, | Performed by: OPHTHALMOLOGY

## 2019-12-04 PROCEDURE — 67028 INJECTION EYE DRUG: CPT | Mod: S$PBB,RT,, | Performed by: OPHTHALMOLOGY

## 2019-12-04 PROCEDURE — 67028 PR INJECT INTRAVITREAL PHARMCOLOGIC: ICD-10-PCS | Mod: S$PBB,RT,, | Performed by: OPHTHALMOLOGY

## 2019-12-04 PROCEDURE — 99499 UNLISTED E&M SERVICE: CPT | Mod: S$PBB,,, | Performed by: OPHTHALMOLOGY

## 2019-12-04 PROCEDURE — 67028 INJECTION EYE DRUG: CPT | Mod: PBBFAC,RT | Performed by: OPHTHALMOLOGY

## 2019-12-04 RX ADMIN — AFLIBERCEPT 2 MG: 40 INJECTION, SOLUTION INTRAVITREAL at 09:12

## 2019-12-04 NOTE — PROGRESS NOTES
===============================  Fei Vicente Sr.,   89 y.o. male   Last visit J[facility]: :10/9/2019   Last visit eye dept. 10/9/2019  [facility]:  Uncorrected distance visual acuity was 20/60 in the right eye and 20/400 in the left eye.   Not recorded         Not recorded         Not recorded         Not recorded        Chief Complaint   Patient presents with    srn     eylea od          ________________  12/4/2019  HPI     srn      Additional comments: eylea od              Comments     Bilateral SRN  Previously treated at [facility] by Dr. Maloney  Eylea ou by dr marilyn whipple 7, last 6/26/19  Decided to discontinue injections to os and just follow  Eylea od by dr marilyn whipple 2, last 10/9/19          Last edited by BRADFORD Santos on 12/4/2019  9:00 AM. (History)      Problem List Items Addressed This Visit        Eye/Vision problems    Exudative age-related macular degeneration of right eye with active choroidal neovascularization - Primary    Relevant Medications    aflibercept Soln 2 mg (Completed)    Other Relevant Orders    Prior Authorization Order        Ou tx recently following os   today 8 weks  rtc to  19 weeks  Oct ok ou  12/4/2019  Diagnosis :  od  srn follow os  Today:   Eylea (afibercept) 2 mg/0.05 ml Intravitreal Injection , OD   Follow up: rtc 10 weeks         Instructed to call 24/7 for any worsening of vision. Check Both eyes daily. Gave patient my home phone number.  Risks, benefits, and alternatives to treatment discussed in detail with the patient.  The patient voiced understanding and wished to proceed with the procedure     Injection Procedure Note:       Patient Identified and Time Out complete  Subconjunctival bleb - xylocaine with epi 2%   and Betadine.  Inject eylea OD at 6:00 @ 3.5-4mm posterior to limbus  Post Operative Dx: Same  Complications: None  Follow up as above.  '    .       ===========================

## 2020-02-12 ENCOUNTER — PROCEDURE VISIT (OUTPATIENT)
Dept: OPHTHALMOLOGY | Facility: CLINIC | Age: 85
End: 2020-02-12
Payer: MEDICARE

## 2020-02-12 DIAGNOSIS — H35.3211 EXUDATIVE AGE-RELATED MACULAR DEGENERATION OF RIGHT EYE WITH ACTIVE CHOROIDAL NEOVASCULARIZATION: Primary | ICD-10-CM

## 2020-02-12 PROCEDURE — 92134 CPTRZ OPH DX IMG PST SGM RTA: CPT | Mod: PBBFAC | Performed by: OPHTHALMOLOGY

## 2020-02-12 PROCEDURE — 99499 NO LOS: ICD-10-PCS | Mod: S$PBB,,, | Performed by: OPHTHALMOLOGY

## 2020-02-12 PROCEDURE — 67028 INJECTION EYE DRUG: CPT | Mod: PBBFAC,RT | Performed by: OPHTHALMOLOGY

## 2020-02-12 PROCEDURE — 67028 INJECTION EYE DRUG: CPT | Mod: S$PBB,RT,, | Performed by: OPHTHALMOLOGY

## 2020-02-12 PROCEDURE — 92134 POSTERIOR SEGMENT OCT RETINA (OCULAR COHERENCE TOMOGRAPHY)-BOTH EYES: ICD-10-PCS | Mod: 26,S$PBB,, | Performed by: OPHTHALMOLOGY

## 2020-02-12 PROCEDURE — 99499 UNLISTED E&M SERVICE: CPT | Mod: S$PBB,,, | Performed by: OPHTHALMOLOGY

## 2020-02-12 PROCEDURE — 67028 PR INJECT INTRAVITREAL PHARMCOLOGIC: ICD-10-PCS | Mod: S$PBB,RT,, | Performed by: OPHTHALMOLOGY

## 2020-02-12 RX ADMIN — AFLIBERCEPT 2 MG: 40 INJECTION, SOLUTION INTRAVITREAL at 10:02

## 2020-02-12 NOTE — PROGRESS NOTES
===============================  Fei Vicente Sr.,  2/12/2020 today   89 y.o. male   Last visit JCC: :12/4/2019   Last visit eye dept. 12/4/2019  [facility]:  Uncorrected distance visual acuity was 20/200 in the right eye and 20/400 in the left eye.   Not recorded         Not recorded         Not recorded         Not recorded        Chief Complaint   Patient presents with    Macular Degeneration     eylea OD       ________________  2/12/2020 today  HPI     Macular Degeneration      Additional comments: eylea OD              Comments     Bilateral SRN  Previously treated at [facility] by Dr. Maloney  Decided to discontinue injections to os and just follow  Eylea od last 12/4/19, last OS 6/26/19            Last edited by PAUL Delgadillo MD on 2/12/2020  9:23 AM. (History)      Problem List Items Addressed This Visit        Eye/Vision problems    Exudative age-related macular degeneration of right eye with active choroidal neovascularization - Primary    Relevant Medications    aflibercept Soln 2 mg (Completed) (Start on 2/12/2020 10:45 AM)    Other Relevant Orders    Prior Authorization Order    Posterior Segment OCT Retina-Both eyes (Completed)        Oct s  owrse and sl subj V   do rfx next visit intially    2/12/2020  Diagnosis :  od srn  Today:   Eylea (afibercept) 2 mg/0.05 ml Intravitreal Injection , OD   Follow up: rtc 8 weeks, refraction then eylea od    Instructed to call 24/7 for any worsening of vision. Check Both eyes daily. Gave patient my home phone number.  Risks, benefits, and alternatives to treatment discussed in detail with the patient.  The patient voiced understanding and wished to proceed with the procedure     Injection Procedure Note:     Patient Identified and Time Out complete  Subconjunctival bleb - xylocaine with epi 2%   and Betadine.  Inject eylea od at 6:00 @ 3.5-4mm posterior to limbus  Post Operative Dx: Same  Complications: None  Follow up as  above.    .      ===========================

## 2020-03-05 ENCOUNTER — PATIENT MESSAGE (OUTPATIENT)
Dept: HEMATOLOGY/ONCOLOGY | Facility: CLINIC | Age: 85
End: 2020-03-05

## 2020-03-15 ENCOUNTER — PATIENT MESSAGE (OUTPATIENT)
Dept: HEMATOLOGY/ONCOLOGY | Facility: CLINIC | Age: 85
End: 2020-03-15

## 2020-03-24 ENCOUNTER — OFFICE VISIT (OUTPATIENT)
Dept: HEMATOLOGY/ONCOLOGY | Facility: CLINIC | Age: 85
End: 2020-03-24
Payer: MEDICARE

## 2020-03-24 DIAGNOSIS — D47.2 MGUS (MONOCLONAL GAMMOPATHY OF UNKNOWN SIGNIFICANCE): Primary | ICD-10-CM

## 2020-03-24 DIAGNOSIS — R76.8 ELEVATED SERUM IMMUNOGLOBULIN FREE LIGHT CHAINS: ICD-10-CM

## 2020-03-24 PROCEDURE — 99213 OFFICE O/P EST LOW 20 MIN: CPT | Mod: 95,,, | Performed by: INTERNAL MEDICINE

## 2020-03-24 PROCEDURE — 99213 PR OFFICE/OUTPT VISIT, EST, LEVL III, 20-29 MIN: ICD-10-PCS | Mod: 95,,, | Performed by: INTERNAL MEDICINE

## 2020-03-24 NOTE — PROGRESS NOTES
Subjective:       Patient ID: Fei Vicente Sr. is a 89 y.o. male.    Chief Complaint: Results (MGUS)    HPI 89-year-old male seen on video visit with patient's son at [facility] his home from follow-up of MGUS.  Patient seen during corona virus event    Past Medical History:   Diagnosis Date    Arthritis     Hypertension     Macular degeneration     Retinal detachment      History reviewed. No pertinent family history.  Social History     Socioeconomic History    Marital status:      Spouse name: Not on file    Number of children: Not on file    Years of education: Not on file    Highest education level: Not on file   Occupational History    Not on file   Social Needs    Financial resource strain: Not on file    Food insecurity:     Worry: Not on file     Inability: Not on file    Transportation needs:     Medical: Not on file     Non-medical: Not on file   Tobacco Use    Smoking status: Never Smoker    Smokeless tobacco: Never Used   Substance and Sexual Activity    Alcohol use: Never     Frequency: Never    Drug use: Not on file    Sexual activity: Not on file   Lifestyle    Physical activity:     Days per week: Not on file     Minutes per session: Not on file    Stress: Not on file   Relationships    Social connections:     Talks on phone: Not on file     Gets together: Not on file     Attends Mandaeism service: Not on file     Active member of club or organization: Not on file     Attends meetings of clubs or organizations: Not on file     Relationship status: Not on file   Other Topics Concern    Not on file   Social History Narrative    Not on file     Past Surgical History:   Procedure Laterality Date    CATARACT EXTRACTION      RETINAL DETACHMENT SURGERY         Labs:  Lab Results   Component Value Date    WBC 5.95 08/26/2019    HGB 12.3 (L) 08/26/2019    HCT 37.7 (L) 08/26/2019     (H) 08/26/2019    PLT 72 (L) 08/26/2019     BMP  Lab Results   Component Value  Date     08/26/2019    K 4.9 08/26/2019     08/26/2019    CO2 27 08/26/2019    BUN 21 08/26/2019    CREATININE 1.1 08/26/2019    CALCIUM 10.1 08/26/2019    ANIONGAP 7 (L) 08/26/2019    ESTGFRAFRICA >60.0 08/26/2019    EGFRNONAA 57.2 (A) 08/26/2019     Lab Results   Component Value Date    ALT 10 08/26/2019    AST 15 08/26/2019    ALKPHOS 62 08/26/2019    BILITOT 0.3 08/26/2019       Lab Results   Component Value Date    IRON 98 01/08/2019    TIBC 377 01/08/2019    FERRITIN 32 01/08/2019     Lab Results   Component Value Date    [id number] 1532 (H) 01/08/2019     No results found for: FOLATE  Lab Results   Component Value Date    TSH 1.602 01/08/2019         Review of Systems   Psychiatric/Behavioral: Positive for dysphoric mood. The patient is nervous/anxious.        Objective:      Physical Exam   Psychiatric: His mood appears anxious.           Assessment:      1. MGUS (monoclonal gammopathy of unknown significance)    2. Elevated serum immunoglobulin free light chains           Plan:     The patient location is:  home  The chief complaint leading to consultation is:  Follow-up laboratory studies  Visit type: Virtual visit with synchronous audio and video  Total time spent with patient: 15 mins  Each patient to whom he or she provides medical services by telemedicine is:  (1) informed of the relationship between the physician and patient and the respective role of any other health care provider with respect to management of the patient; and (2) notified that he or she may decline to receive medical services by telemedicine and may withdraw from such care at any time.    Notes:  Patient seen during corona virus event.  At this point reviewed most recent laboratory studies stable told patient and son to wait 2-3 months and repeat laboratory studies in follow-up with video visit.        Ashok Saravia Jr, MD FACP

## 2020-05-21 ENCOUNTER — PATIENT MESSAGE (OUTPATIENT)
Dept: OPHTHALMOLOGY | Facility: CLINIC | Age: 85
End: 2020-05-21

## 2020-06-10 ENCOUNTER — PROCEDURE VISIT (OUTPATIENT)
Dept: OPHTHALMOLOGY | Facility: CLINIC | Age: 85
End: 2020-06-10
Payer: MEDICARE

## 2020-06-10 ENCOUNTER — LAB VISIT (OUTPATIENT)
Dept: LAB | Facility: HOSPITAL | Age: 85
End: 2020-06-10
Attending: RADIOLOGY
Payer: MEDICARE

## 2020-06-10 ENCOUNTER — TELEPHONE (OUTPATIENT)
Dept: HEMATOLOGY/ONCOLOGY | Facility: CLINIC | Age: 85
End: 2020-06-10

## 2020-06-10 DIAGNOSIS — H35.3223 EXUDATIVE AGE-RELATED MACULAR DEGENERATION OF LEFT EYE WITH INACTIVE SCAR: ICD-10-CM

## 2020-06-10 DIAGNOSIS — D47.2 MGUS (MONOCLONAL GAMMOPATHY OF UNKNOWN SIGNIFICANCE): ICD-10-CM

## 2020-06-10 DIAGNOSIS — H35.3212 EXUDATIVE AGE-RELATED MACULAR DEGENERATION OF RIGHT EYE WITH INACTIVE CHOROIDAL NEOVASCULARIZATION: Primary | ICD-10-CM

## 2020-06-10 DIAGNOSIS — I10 ESSENTIAL HYPERTENSION: ICD-10-CM

## 2020-06-10 LAB
ALBUMIN SERPL BCP-MCNC: 3.5 G/DL (ref 3.5–5.2)
ALP SERPL-CCNC: 56 U/L (ref 55–135)
ALT SERPL W/O P-5'-P-CCNC: 10 U/L (ref 10–44)
ANION GAP SERPL CALC-SCNC: 5 MMOL/L (ref 8–16)
AST SERPL-CCNC: 15 U/L (ref 10–40)
B2 MICROGLOB SERPL-MCNC: 2.9 UG/ML (ref 0–2.5)
BASOPHILS # BLD AUTO: 0.03 K/UL (ref 0–0.2)
BASOPHILS NFR BLD: 0.5 % (ref 0–1.9)
BILIRUB SERPL-MCNC: 0.4 MG/DL (ref 0.1–1)
BUN SERPL-MCNC: 20 MG/DL (ref 10–30)
CALCIUM SERPL-MCNC: 9.8 MG/DL (ref 8.7–10.5)
CHLORIDE SERPL-SCNC: 109 MMOL/L (ref 95–110)
CO2 SERPL-SCNC: 26 MMOL/L (ref 23–29)
CREAT SERPL-MCNC: 1 MG/DL (ref 0.5–1.4)
DIFFERENTIAL METHOD: ABNORMAL
EOSINOPHIL # BLD AUTO: 0.4 K/UL (ref 0–0.5)
EOSINOPHIL NFR BLD: 6 % (ref 0–8)
ERYTHROCYTE [DISTWIDTH] IN BLOOD BY AUTOMATED COUNT: 12.9 % (ref 11.5–14.5)
EST. GFR  (AFRICAN AMERICAN): >60 ML/MIN/1.73 M^2
EST. GFR  (NON AFRICAN AMERICAN): >60 ML/MIN/1.73 M^2
GLUCOSE SERPL-MCNC: 105 MG/DL (ref 70–110)
HCT VFR BLD AUTO: 37 % (ref 40–54)
HGB BLD-MCNC: 12 G/DL (ref 14–18)
IMM GRANULOCYTES # BLD AUTO: 0.02 K/UL (ref 0–0.04)
IMM GRANULOCYTES NFR BLD AUTO: 0.3 % (ref 0–0.5)
LYMPHOCYTES # BLD AUTO: 1.7 K/UL (ref 1–4.8)
LYMPHOCYTES NFR BLD: 26.6 % (ref 18–48)
MCH RBC QN AUTO: 32.1 PG (ref 27–31)
MCHC RBC AUTO-ENTMCNC: 32.4 G/DL (ref 32–36)
MCV RBC AUTO: 99 FL (ref 82–98)
MONOCYTES # BLD AUTO: 0.7 K/UL (ref 0.3–1)
MONOCYTES NFR BLD: 10.9 % (ref 4–15)
NEUTROPHILS # BLD AUTO: 3.7 K/UL (ref 1.8–7.7)
NEUTROPHILS NFR BLD: 56 % (ref 38–73)
NRBC BLD-RTO: 0 /100 WBC
PLATELET # BLD AUTO: 21 K/UL (ref 150–350)
PLATELET BLD QL SMEAR: ABNORMAL
PMV BLD AUTO: 11.3 FL (ref 9.2–12.9)
POTASSIUM SERPL-SCNC: 4.1 MMOL/L (ref 3.5–5.1)
PROT SERPL-MCNC: 5.7 G/DL (ref 6–8.4)
RBC # BLD AUTO: 3.74 M/UL (ref 4.6–6.2)
SODIUM SERPL-SCNC: 140 MMOL/L (ref 136–145)
WBC # BLD AUTO: 6.54 K/UL (ref 3.9–12.7)

## 2020-06-10 PROCEDURE — 36415 COLL VENOUS BLD VENIPUNCTURE: CPT

## 2020-06-10 PROCEDURE — 83520 IMMUNOASSAY QUANT NOS NONAB: CPT | Mod: 59

## 2020-06-10 PROCEDURE — 84165 PROTEIN E-PHORESIS SERUM: CPT

## 2020-06-10 PROCEDURE — 92134 CPTRZ OPH DX IMG PST SGM RTA: CPT | Mod: PBBFAC | Performed by: OPHTHALMOLOGY

## 2020-06-10 PROCEDURE — 92134 POSTERIOR SEGMENT OCT RETINA (OCULAR COHERENCE TOMOGRAPHY)-BOTH EYES: ICD-10-PCS | Mod: 26,S$PBB,, | Performed by: OPHTHALMOLOGY

## 2020-06-10 PROCEDURE — 92014 COMPRE OPH EXAM EST PT 1/>: CPT | Mod: S$PBB,,, | Performed by: OPHTHALMOLOGY

## 2020-06-10 PROCEDURE — 82232 ASSAY OF BETA-2 PROTEIN: CPT

## 2020-06-10 PROCEDURE — 80053 COMPREHEN METABOLIC PANEL: CPT

## 2020-06-10 PROCEDURE — 84165 PROTEIN E-PHORESIS SERUM: CPT | Mod: 26,,, | Performed by: PATHOLOGY

## 2020-06-10 PROCEDURE — 92014 PR EYE EXAM, EST PATIENT,COMPREHESV: ICD-10-PCS | Mod: S$PBB,,, | Performed by: OPHTHALMOLOGY

## 2020-06-10 PROCEDURE — 85025 COMPLETE CBC W/AUTO DIFF WBC: CPT

## 2020-06-10 PROCEDURE — 84165 PATHOLOGIST INTERPRETATION SPE: ICD-10-PCS | Mod: 26,,, | Performed by: PATHOLOGY

## 2020-06-10 NOTE — PROGRESS NOTES
===============================  Fei Vicente Sr.,  6/10/2020 today   89 y.o. male   Last visit [facility]: :2/12/2020   Last visit eye dept. 2/12/2020  [facility]:  Uncorrected distance visual acuity was 20/200 in the right eye and 20/400 in the left eye.  Tonometry     Tonometry (Applanation, 10:35 AM)       Right Left    Pressure 13 13               Not recorded         Not recorded         Not recorded        Chief Complaint   Patient presents with    Macular Degeneration     eylea od       ________________  6/10/2020 today  HPI     Macular Degeneration      Additional comments: eylea od              Comments     Bilateral SRN  Previously treated at [facility] by Dr. Maloney  Decided to discontinue injections to os and just follow  Eylea od last 2/12/20 last OS 6/26/19          Last edited by Claudia Chin on 6/10/2020 10:15 AM. 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 Check OU daily.   Gave my office and cell phone number.  rtc 4 months recheck      .      ===========================

## 2020-06-11 LAB
ALBUMIN SERPL ELPH-MCNC: 3.53 G/DL (ref 3.35–5.55)
ALPHA1 GLOB SERPL ELPH-MCNC: 0.24 G/DL (ref 0.17–0.41)
ALPHA2 GLOB SERPL ELPH-MCNC: 0.61 G/DL (ref 0.43–0.99)
B-GLOBULIN SERPL ELPH-MCNC: 0.59 G/DL (ref 0.5–1.1)
GAMMA GLOB SERPL ELPH-MCNC: 0.44 G/DL (ref 0.67–1.58)
KAPPA LC SER QL IA: 1.59 MG/DL (ref 0.33–1.94)
KAPPA LC/LAMBDA SER IA: 0.02 (ref 0.26–1.65)
LAMBDA LC SER QL IA: 84.19 MG/DL (ref 0.57–2.63)
PATHOLOGIST INTERPRETATION SPE: NORMAL
PROT SERPL-MCNC: 5.4 G/DL (ref 6–8.4)

## 2020-06-15 ENCOUNTER — OFFICE VISIT (OUTPATIENT)
Dept: HEMATOLOGY/ONCOLOGY | Facility: CLINIC | Age: 85
End: 2020-06-15
Payer: MEDICARE

## 2020-06-15 DIAGNOSIS — D69.6 THROMBOCYTOPENIA: ICD-10-CM

## 2020-06-15 DIAGNOSIS — D47.2 MGUS (MONOCLONAL GAMMOPATHY OF UNKNOWN SIGNIFICANCE): Primary | ICD-10-CM

## 2020-06-15 DIAGNOSIS — R76.8 ELEVATED SERUM IMMUNOGLOBULIN FREE LIGHT CHAINS: ICD-10-CM

## 2020-06-15 DIAGNOSIS — D53.9 MACROCYTIC ANEMIA: ICD-10-CM

## 2020-06-15 PROCEDURE — 99214 PR OFFICE/OUTPT VISIT, EST, LEVL IV, 30-39 MIN: ICD-10-PCS | Mod: 95,,, | Performed by: INTERNAL MEDICINE

## 2020-06-15 PROCEDURE — 99214 OFFICE O/P EST MOD 30 MIN: CPT | Mod: 95,,, | Performed by: INTERNAL MEDICINE

## 2020-06-15 NOTE — PROGRESS NOTES
Subjective:       Patient ID: Fei Vicente Sr. is a 89 y.o. male.    Chief Complaint: Results and Multiple Myeloma    HPI 89-year-old male history of monoclonal gammopathy of undetermined significance patient has been observed for the last year returns with laboratory studies dramatic fall in platelet count down to 21,000 video visit with he and son    Past Medical History:   Diagnosis Date    Arthritis     Hypertension     Macular degeneration     Retinal detachment      History reviewed. No pertinent family history.  Social History     Socioeconomic History    Marital status:      Spouse name: Not on file    Number of children: Not on file    Years of education: Not on file    Highest education level: Not on file   Occupational History    Not on file   Social Needs    Financial resource strain: Not on file    Food insecurity     Worry: Not on file     Inability: Not on file    Transportation needs     Medical: Not on file     Non-medical: Not on file   Tobacco Use    Smoking status: Never Smoker    Smokeless tobacco: Never Used   Substance and Sexual Activity    Alcohol use: Never     Frequency: Never    Drug use: Not on file    Sexual activity: Not on file   Lifestyle    Physical activity     Days per week: Not on file     Minutes per session: Not on file    Stress: Not on file   Relationships    Social connections     Talks on phone: Not on file     Gets together: Not on file     Attends Jainism service: Not on file     Active member of club or organization: Not on file     Attends meetings of clubs or organizations: Not on file     Relationship status: Not on file   Other Topics Concern    Not on file   Social History Narrative    Not on file     Past Surgical History:   Procedure Laterality Date    CATARACT EXTRACTION      RETINAL DETACHMENT SURGERY         Labs:  Lab Results   Component Value Date    WBC 6.54 06/10/2020    HGB 12.0 (L) 06/10/2020    HCT 37.0 (L) 06/10/2020     MCV 99 (H) 06/10/2020    PLT 21 (LL) 06/10/2020     BMP  Lab Results   Component Value Date     06/10/2020    K 4.1 06/10/2020     06/10/2020    CO2 26 06/10/2020    BUN 20 06/10/2020    CREATININE 1.0 06/10/2020    CALCIUM 9.8 06/10/2020    ANIONGAP 5 (L) 06/10/2020    ESTGFRAFRICA >60 06/10/2020    EGFRNONAA >60 06/10/2020     Lab Results   Component Value Date    ALT 10 06/10/2020    AST 15 06/10/2020    ALKPHOS 56 06/10/2020    BILITOT 0.4 06/10/2020       Lab Results   Component Value Date    IRON 98 01/08/2019    TIBC 377 01/08/2019    FERRITIN 32 01/08/2019     Lab Results   Component Value Date    [id number] 1532 (H) 01/08/2019     No results found for: FOLATE  Lab Results   Component Value Date    TSH 1.602 01/08/2019         Review of Systems   Constitutional: Positive for fatigue.   Neurological: Positive for weakness.   Psychiatric/Behavioral: Positive for dysphoric mood. The patient is nervous/anxious.        Objective:      Physical Exam  Psychiatric:         Mood and Affect: Mood is anxious.             Assessment:      1. MGUS (monoclonal gammopathy of unknown significance)    2. Elevated serum immunoglobulin free light chains    3. Thrombocytopenia           Plan:     The patient location is: HOMEThe chief complaint leading to consultation is: 111    Visit type:  Synchronous audio and video    Face to Face time with patient: 25 minutes of total time spent on the encounter, which includes face to face time and non-face to face time preparing to see the patient (eg, review of tests), Obtaining and/or reviewing separately obtained history, Documenting clinical information in the electronic or other health record, Independently interpreting results (not separately reported) and communicating results to the patient/family/caregiver, or Care coordination (not separately reported).         Each patient to whom he or she provides medical services by telemedicine is:  (1) informed of the  relationship between the physician and patient and the respective role of any other health care provider with respect to management of the patient; and (2) notified that he or she may decline to receive medical services by telemedicine and may withdraw from such care at any time.    Notes:  Reviewed information with patient and son decline in platelet count increase in free light chain ratio high likelihood progression of myeloma discussed pros and cons of treatment consideration of treatment with dexamethasone low-dose lenalidomide.  In addition talked about hospice care answered questions would like to have he and patient in for review and discussion of treatment goals in options        Ashok Saravia Jr, MD FACP

## 2020-06-16 ENCOUNTER — TELEPHONE (OUTPATIENT)
Dept: HEMATOLOGY/ONCOLOGY | Facility: CLINIC | Age: 85
End: 2020-06-16

## 2020-06-16 NOTE — TELEPHONE ENCOUNTER
Attempted to reach the patient in regards to me scheduling him an appt on this Thursday per Dr. Saravia patient did not answer I left a detailed message for patient to return my call.    Patient son called back to confirm appt for Thursday I verbalized my understanding.

## 2020-06-18 ENCOUNTER — TELEPHONE (OUTPATIENT)
Dept: INTERNAL MEDICINE | Facility: CLINIC | Age: 85
End: 2020-06-18

## 2020-06-18 ENCOUNTER — OFFICE VISIT (OUTPATIENT)
Dept: HEMATOLOGY/ONCOLOGY | Facility: CLINIC | Age: 85
End: 2020-06-18
Payer: MEDICARE

## 2020-06-18 VITALS
TEMPERATURE: 98 F | WEIGHT: 176.38 LBS | SYSTOLIC BLOOD PRESSURE: 154 MMHG | HEIGHT: 68 IN | OXYGEN SATURATION: 96 % | BODY MASS INDEX: 26.73 KG/M2 | DIASTOLIC BLOOD PRESSURE: 77 MMHG | HEART RATE: 81 BPM

## 2020-06-18 DIAGNOSIS — C90.00 MULTIPLE MYELOMA NOT HAVING ACHIEVED REMISSION: ICD-10-CM

## 2020-06-18 DIAGNOSIS — D69.6 THROMBOCYTOPENIA: ICD-10-CM

## 2020-06-18 DIAGNOSIS — D53.9 MACROCYTIC ANEMIA: ICD-10-CM

## 2020-06-18 DIAGNOSIS — D47.2 MGUS (MONOCLONAL GAMMOPATHY OF UNKNOWN SIGNIFICANCE): Primary | ICD-10-CM

## 2020-06-18 DIAGNOSIS — R76.8 ELEVATED SERUM IMMUNOGLOBULIN FREE LIGHT CHAINS: ICD-10-CM

## 2020-06-18 PROCEDURE — 99215 OFFICE O/P EST HI 40 MIN: CPT | Mod: PBBFAC | Performed by: INTERNAL MEDICINE

## 2020-06-18 PROCEDURE — 99215 OFFICE O/P EST HI 40 MIN: CPT | Mod: S$PBB,,, | Performed by: INTERNAL MEDICINE

## 2020-06-18 PROCEDURE — 99999 PR PBB SHADOW E&M-EST. PATIENT-LVL V: CPT | Mod: PBBFAC,,, | Performed by: INTERNAL MEDICINE

## 2020-06-18 PROCEDURE — 99215 PR OFFICE/OUTPT VISIT, EST, LEVL V, 40-54 MIN: ICD-10-PCS | Mod: S$PBB,,, | Performed by: INTERNAL MEDICINE

## 2020-06-18 PROCEDURE — 99999 PR PBB SHADOW E&M-EST. PATIENT-LVL V: ICD-10-PCS | Mod: PBBFAC,,, | Performed by: INTERNAL MEDICINE

## 2020-06-18 RX ORDER — ACETAMINOPHEN 325 MG/1
650 TABLET ORAL
Status: CANCELLED | OUTPATIENT
Start: 2020-06-18

## 2020-06-18 RX ORDER — DIPHENHYDRAMINE HCL 25 MG
25 CAPSULE ORAL
Status: CANCELLED | OUTPATIENT
Start: 2020-06-18

## 2020-06-18 RX ORDER — HYDROCODONE BITARTRATE AND ACETAMINOPHEN 500; 5 MG/1; MG/1
TABLET ORAL ONCE
Status: CANCELLED | OUTPATIENT
Start: 2020-06-18 | End: 2020-06-18

## 2020-06-18 NOTE — PROGRESS NOTES
Subjective:       Patient ID: Fei Vicente Sr. is a 89 y.o. male.    Chief Complaint: Results    HPI 89-year-old male returns with patient's son recent video visit to discuss dramatic fall in platelet count and rise in free light chain ratio    Past Medical History:   Diagnosis Date    Arthritis     Hypertension     Macular degeneration     Retinal detachment      History reviewed. No pertinent family history.  Social History     Socioeconomic History    Marital status:      Spouse name: Not on file    Number of children: Not on file    Years of education: Not on file    Highest education level: Not on file   Occupational History    Not on file   Social Needs    Financial resource strain: Not on file    Food insecurity     Worry: Not on file     Inability: Not on file    Transportation needs     Medical: Not on file     Non-medical: Not on file   Tobacco Use    Smoking status: Never Smoker    Smokeless tobacco: Never Used   Substance and Sexual Activity    Alcohol use: Never     Frequency: Never    Drug use: Not on file    Sexual activity: Not on file   Lifestyle    Physical activity     Days per week: Not on file     Minutes per session: Not on file    Stress: Not on file   Relationships    Social connections     Talks on phone: Not on file     Gets together: Not on file     Attends Taoist service: Not on file     Active member of club or organization: Not on file     Attends meetings of clubs or organizations: Not on file     Relationship status: Not on file   Other Topics Concern    Not on file   Social History Narrative    Not on file     Past Surgical History:   Procedure Laterality Date    CATARACT EXTRACTION      RETINAL DETACHMENT SURGERY         Labs:  Lab Results   Component Value Date    WBC 6.54 06/10/2020    HGB 12.0 (L) 06/10/2020    HCT 37.0 (L) 06/10/2020    MCV 99 (H) 06/10/2020    PLT 21 (LL) 06/10/2020     BMP  Lab Results   Component Value Date      06/10/2020    K 4.1 06/10/2020     06/10/2020    CO2 26 06/10/2020    BUN 20 06/10/2020    CREATININE 1.0 06/10/2020    CALCIUM 9.8 06/10/2020    ANIONGAP 5 (L) 06/10/2020    ESTGFRAFRICA >60 06/10/2020    EGFRNONAA >60 06/10/2020     Lab Results   Component Value Date    ALT 10 06/10/2020    AST 15 06/10/2020    ALKPHOS 56 06/10/2020    BILITOT 0.4 06/10/2020       Lab Results   Component Value Date    IRON 98 01/08/2019    TIBC 377 01/08/2019    FERRITIN 32 01/08/2019     Lab Results   Component Value Date    [id number] 1532 (H) 01/08/2019     No results found for: FOLATE  Lab Results   Component Value Date    TSH 1.602 01/08/2019         Review of Systems   Constitutional: Positive for fatigue. Negative for activity change, appetite change, chills, diaphoresis, fever and unexpected weight change.   HENT: Negative for congestion, dental problem, drooling, ear discharge, ear pain, facial swelling, hearing loss, mouth sores, nosebleeds, postnasal drip, rhinorrhea, sinus pressure, sneezing, sore throat, tinnitus, trouble swallowing and voice change.    Eyes: Negative for photophobia, pain, discharge, redness, itching and visual disturbance.   Respiratory: Negative for apnea, cough, choking, chest tightness, shortness of breath, wheezing and stridor.    Cardiovascular: Negative for chest pain, palpitations and leg swelling.   Gastrointestinal: Negative for abdominal distention, abdominal pain, anal bleeding, blood in stool, constipation, diarrhea, nausea, rectal pain and vomiting.   Endocrine: Negative for cold intolerance, heat intolerance, polydipsia, polyphagia and polyuria.   Genitourinary: Negative for decreased urine volume, difficulty urinating, discharge, dysuria, enuresis, flank pain, frequency, genital sores, hematuria, penile pain, penile swelling, scrotal swelling, testicular pain and urgency.   Musculoskeletal: Positive for gait problem. Negative for arthralgias, back pain, joint swelling, myalgias,  neck pain and neck stiffness.   Skin: Negative for color change, pallor, rash and wound.   Allergic/Immunologic: Negative for environmental allergies, food allergies and immunocompromised state.   Neurological: Positive for weakness. Negative for dizziness, tremors, seizures, syncope, facial asymmetry, speech difficulty, light-headedness, numbness and headaches.   Hematological: Negative for adenopathy. Does not bruise/bleed easily.   Psychiatric/Behavioral: Positive for dysphoric mood. Negative for agitation, behavioral problems, confusion, decreased concentration, hallucinations, self-injury, sleep disturbance and suicidal ideas. The patient is nervous/anxious. The patient is not hyperactive.        Objective:      Physical Exam  Vitals signs reviewed.   Constitutional:       General: He is not in acute distress.     Appearance: He is well-developed. He is not diaphoretic.   HENT:      Head: Normocephalic.      Right Ear: External ear normal.      Left Ear: External ear normal.      Nose: Nose normal.      Right Sinus: No maxillary sinus tenderness or frontal sinus tenderness.      Left Sinus: No maxillary sinus tenderness or frontal sinus tenderness.      Mouth/Throat:      Pharynx: No oropharyngeal exudate.   Eyes:      General: Lids are normal. No scleral icterus.        Right eye: No discharge.         Left eye: No discharge.      Extraocular Movements:      Right eye: Normal extraocular motion.      Left eye: Normal extraocular motion.      Conjunctiva/sclera:      Right eye: Right conjunctiva is not injected. No hemorrhage.     Left eye: Left conjunctiva is not injected. No hemorrhage.     Pupils: Pupils are equal, round, and reactive to light.   Neck:      Musculoskeletal: Normal range of motion and neck supple.      Thyroid: No thyromegaly.      Vascular: No JVD.      Trachea: No tracheal deviation.   Cardiovascular:      Rate and Rhythm: Normal rate.   Pulmonary:      Effort: Pulmonary effort is normal.  No respiratory distress.      Breath sounds: No stridor.   Abdominal:      General: Bowel sounds are normal.      Palpations: Abdomen is soft. There is no hepatomegaly, splenomegaly or mass.      Tenderness: There is no abdominal tenderness.   Musculoskeletal: Normal range of motion.         General: No tenderness.   Lymphadenopathy:      Head:      Right side of head: No posterior auricular or occipital adenopathy.      Left side of head: No posterior auricular or occipital adenopathy.      Cervical: No cervical adenopathy.      Right cervical: No superficial, deep or posterior cervical adenopathy.     Left cervical: No superficial, deep or posterior cervical adenopathy.      Upper Body:      Right upper body: No supraclavicular adenopathy.      Left upper body: No supraclavicular adenopathy.   Skin:     General: Skin is dry.      Findings: No erythema or rash.      Nails: There is no clubbing.     Neurological:      Mental Status: He is alert and oriented to person, place, and time.      Cranial Nerves: No cranial nerve deficit.      Motor: Weakness present.      Coordination: Coordination abnormal.   Psychiatric:         Behavior: Behavior normal.         Thought Content: Thought content normal.         Judgment: Judgment normal.             Assessment:      1. MGUS (monoclonal gammopathy of unknown significance)    2. Multiple myeloma not having achieved remission    3. Elevated serum immunoglobulin free light chains    4. Thrombocytopenia    5. Macrocytic anemia           Plan:     Extensive conversation with the patient and his son in the room I reviewed laboratory studies with him he most likely has progression of his MGUS to more aggressive multiple myeloma with but with a dramatic fall in platelet count a previous history of a macrocytic anemia concerned about possibility of other myeloproliferative process in bone marrow contributing to fall in platelet count.  We talked about in. Treatment with either  Revlimid or Velcade.  To see whether not he response or to proceed with bone marrow biopsy and aspirate with platelet transfusion prior to confirm exact etiology of thrombocytopenia and developing pancytopenia.  Patient is deciding what he was doing this regard.  Either way will proceed with ambulatory palliative care consultation and will await information from family discussed implications explain that if no treatment will most likely need hospice care survival less than 6 months most common problems will be bleeding as platelet count falls and will bruising breast bleeding from gums or intestinal bleeding would occur 40 min face-to-face time coordination care greater 50% time face-to-face with patient and son        Ashok Saravia Jr, MD FACP

## 2020-06-18 NOTE — TELEPHONE ENCOUNTER
----- Message from Liane Jauregui MA sent at 6/18/2020 11:05 AM CDT -----  Regarding: Referral placed  Good morning,    Per Dr. Saravia needs to be seen asap patient is willing to do a virtual visit.    Thanks,  Dom

## 2020-06-23 ENCOUNTER — TELEPHONE (OUTPATIENT)
Dept: INFUSION THERAPY | Facility: HOSPITAL | Age: 85
End: 2020-06-23

## 2020-06-23 ENCOUNTER — OFFICE VISIT (OUTPATIENT)
Dept: PALLIATIVE MEDICINE | Facility: CLINIC | Age: 85
End: 2020-06-23
Payer: MEDICARE

## 2020-06-23 ENCOUNTER — TELEPHONE (OUTPATIENT)
Dept: RADIOLOGY | Facility: HOSPITAL | Age: 85
End: 2020-06-23

## 2020-06-23 DIAGNOSIS — C79.51 SPINE METASTASIS: Primary | ICD-10-CM

## 2020-06-23 DIAGNOSIS — C90.00 MULTIPLE MYELOMA NOT HAVING ACHIEVED REMISSION: ICD-10-CM

## 2020-06-23 DIAGNOSIS — D47.2 MGUS (MONOCLONAL GAMMOPATHY OF UNKNOWN SIGNIFICANCE): ICD-10-CM

## 2020-06-23 PROCEDURE — 99203 PR OFFICE/OUTPT VISIT, NEW, LEVL III, 30-44 MIN: ICD-10-PCS | Mod: 95,,, | Performed by: FAMILY MEDICINE

## 2020-06-23 PROCEDURE — 99203 OFFICE O/P NEW LOW 30 MIN: CPT | Mod: 95,,, | Performed by: FAMILY MEDICINE

## 2020-06-23 PROCEDURE — 99497 PR ADVNCD CARE PLAN 30 MIN: ICD-10-PCS | Mod: 95,25,, | Performed by: FAMILY MEDICINE

## 2020-06-23 PROCEDURE — 99497 ADVNCD CARE PLAN 30 MIN: CPT | Mod: 95,25,, | Performed by: FAMILY MEDICINE

## 2020-06-23 NOTE — TELEPHONE ENCOUNTER
Pre-procedure phone call made at this time. Talked with pt's son about the procedure, to be NPO after midnight, arrive to the hospital on [address] at 9:30am and have a ride with him. Pt's son states that he has a video call with the pallative care doctor this afternoon and may decide to not have the bone marrow biopsy done and he states that he will call us if that is true so that we can cancel the pt off of our schedule. Pt's son verbalized understanding of all instructions given.

## 2020-06-23 NOTE — PROGRESS NOTES
Subjective:       Patient ID: Fei Vicente Sr. is a 89 y.o. male.    Chief Complaint: initial palliative consult    The patient location is: [place]  The chief complaint leading to consultation is: initial palliative consult    Visit type: audiovisual    Face to Face time with patient: 40  50 minutes of total time spent on the encounter, which includes face to face time and non-face to face time preparing to see the patient (eg, review of tests), Obtaining and/or reviewing separately obtained history, Documenting clinical information in the electronic or other health record, Independently interpreting results (not separately reported) and communicating results to the patient/family/caregiver, or Care coordination (not separately reported).         Each patient to whom he or she provides medical services by telemedicine is:  (1) informed of the relationship between the physician and patient and the respective role of any other health care provider with respect to management of the patient; and (2) notified that he or she may decline to receive medical services by telemedicine and may withdraw from such care at any time.    Notes: 89 yo male with likely relapsed myeloma seen for initial palliative medicine consult. His son and daughter-in-law are present for visit and contribute to history. He has a bone marrow biopsy scheduled for tomorrow which would guide treatment decision in terms of chemotherapy. At this point, he has a good quality of life. Lives at [facility], enjoys ballroom dancing, and has maintained his independence. He is unsure if he wants to risk the negative side effects of chemotherapy. He denies much in the way of symptoms today. He is feeling fairly well, perhaps a little fatigued but denies pain, n/v, insomnia.     Advance Care Planning  He does desire DNR code status. After extensive conversation and questions answered he requested time to think and discuss with his children. I  called the family back around 5 pm and Mr. Vicente expressed his decision to stay at home and pursue comfort focus care. Does not desire BM biopsy. I educated on hospice and recommended information session. They are agreeable and request [facility]. Referral sent by JENN Larose          does not have any pertinent problems on file.  Past Medical History:   Diagnosis Date    Arthritis     Hypertension     Macular degeneration     Retinal detachment      Past Surgical History:   Procedure Laterality Date    CATARACT EXTRACTION      RETINAL DETACHMENT SURGERY       History reviewed. No pertinent family history.  Social History     Socioeconomic History    Marital status:      Spouse name: Not on file    Number of children: Not on file    Years of education: Not on file    Highest education level: Not on file   Occupational History    Not on file   Social Needs    Financial resource strain: Not on file    Food insecurity     Worry: Not on file     Inability: Not on file    Transportation needs     Medical: Not on file     Non-medical: Not on file   Tobacco Use    Smoking status: Never Smoker    Smokeless tobacco: Never Used   Substance and Sexual Activity    Alcohol use: Never     Frequency: Never    Drug use: Not on file    Sexual activity: Not on file   Lifestyle    Physical activity     Days per week: Not on file     Minutes per session: Not on file    Stress: Not on file   Relationships    Social connections     Talks on phone: Not on file     Gets together: Not on file     Attends Anabaptist service: Not on file     Active member of club or organization: Not on file     Attends meetings of clubs or organizations: Not on file     Relationship status: Not on file   Other Topics Concern    Not on file   Social History Narrative    Not on file     Review of Systems   A comprehensive 14-point review of systems was reviewed with patient and was negative other than as specified above.      Objective:   There were no vitals filed for this visit.     Physical Exam  Constitutional:       General: He is not in acute distress.     Appearance: He is well-developed.   HENT:      Head: Normocephalic and atraumatic.   Eyes:      General: No scleral icterus.  Neck:      Musculoskeletal: Normal range of motion and neck supple.   Pulmonary:      Effort: Pulmonary effort is normal. No respiratory distress.   Musculoskeletal: Normal range of motion.   Skin:     Findings: No rash.   Neurological:      Mental Status: He is alert and oriented to person, place, and time.   Psychiatric:         Behavior: Behavior normal.         Thought Content: Thought content normal.         Assessment:       1. Multiple myeloma not having achieved remission        Plan:           Problem List Items Addressed This Visit     None      Visit Diagnoses     Multiple myeloma not having achieved remission        Relevant Orders    Ambulatory referral/consult to Hospice        30 minutes spent in ACP conversation.

## 2020-06-23 NOTE — TELEPHONE ENCOUNTER
Spoke with patient's son, Fei Vicente.  He states that after their palliative care appointment, they have decided against the bone marrow biopsy and platelet transfusion in the morning.  Dr Saravia and radiology notified.

## 2020-06-23 NOTE — LETTER
June 24, 2020      Ashok Saravia MD  [address]  [place] [address]           [address] [address]  Phone: [phone]  Fax: [phone]          Patient: Fei Vicente Sr.   MR Number: [id number]   YOB: 1924   Date of Visit: 6/23/2020       Dear Dr. Ashok Saravia:    Thank you for referring Fei Vicente to me for evaluation. Attached you will find relevant portions of my assessment and plan of care.    If you have questions, please do not hesitate to call me. I look forward to following Fei Vicente along with you.    Sincerely,    Nitza Awad MD    Enclosure  CC:  No Recipients    If you would like to receive this communication electronically, please contact [email or web address] or [phone] to request more information on [facility] access.    For providers and/or their staff who would like to refer a patient to [facility], please contact us through our one-stop-shop provider referral line, [facility], at [phone].    If you feel you have received this communication in error or would no longer like to receive these types of communications, please e-mail [email or web address]

## 2020-06-24 ENCOUNTER — TELEPHONE (OUTPATIENT)
Dept: PALLIATIVE MEDICINE | Facility: CLINIC | Age: 85
End: 2020-06-24

## 2020-06-24 NOTE — TELEPHONE ENCOUNTER
[facility] - Palliative Care  Medical Specialty       Patient Name: Fei Vicente Sr.  MRN: [id number]  Primary Care Physician: Aleksandr Manriquez MD    Referral to [facility] sonja. Eitan, liaison, notified of referral.      Chrissy Snyder, MSW, [facility]  [phone]

## 2020-07-15 DIAGNOSIS — Z71.89 COMPLEX CARE COORDINATION: ICD-10-CM

## 2020-10-06 ENCOUNTER — PATIENT MESSAGE (OUTPATIENT)
Dept: ADMINISTRATIVE | Facility: HOSPITAL | Age: 85
End: 2020-10-06

## 2020-12-22 ENCOUNTER — PATIENT MESSAGE (OUTPATIENT)
Dept: PALLIATIVE MEDICINE | Facility: CLINIC | Age: 85
End: 2020-12-22

## 2021-03-23 ENCOUNTER — PES CALL (OUTPATIENT)
Dept: ADMINISTRATIVE | Facility: CLINIC | Age: 86
End: 2021-03-23

## 2021-04-13 ENCOUNTER — PES CALL (OUTPATIENT)
Dept: ADMINISTRATIVE | Facility: CLINIC | Age: 86
End: 2021-04-13

## 2021-04-29 ENCOUNTER — PATIENT MESSAGE (OUTPATIENT)
Dept: RESEARCH | Facility: HOSPITAL | Age: 86
End: 2021-04-29

## 2021-05-14 ENCOUNTER — PES CALL (OUTPATIENT)
Dept: ADMINISTRATIVE | Facility: CLINIC | Age: 86
End: 2021-05-14

## 2021-05-18 ENCOUNTER — PES CALL (OUTPATIENT)
Dept: ADMINISTRATIVE | Facility: CLINIC | Age: 86
End: 2021-05-18

## 2021-07-24 ENCOUNTER — PATIENT MESSAGE (OUTPATIENT)
Dept: OPHTHALMOLOGY | Facility: CLINIC | Age: 86
End: 2021-07-24

## 2021-12-02 ENCOUNTER — PATIENT MESSAGE (OUTPATIENT)
Dept: RESEARCH | Facility: HOSPITAL | Age: 86
End: 2021-12-02
Payer: MEDICARE

## 2022-03-08 ENCOUNTER — PES CALL (OUTPATIENT)
Dept: ADMINISTRATIVE | Facility: CLINIC | Age: 87
End: 2022-03-08
Payer: MEDICARE

## 2022-04-27 ENCOUNTER — PATIENT MESSAGE (OUTPATIENT)
Dept: ADMINISTRATIVE | Facility: HOSPITAL | Age: 87
End: 2022-04-27
Payer: MEDICARE

## 2022-07-25 ENCOUNTER — PES CALL (OUTPATIENT)
Dept: ADMINISTRATIVE | Facility: CLINIC | Age: 87
End: 2022-07-25
Payer: MEDICARE

## 2022-07-27 ENCOUNTER — PATIENT MESSAGE (OUTPATIENT)
Dept: ADMINISTRATIVE | Facility: HOSPITAL | Age: 87
End: 2022-07-27
Payer: MEDICARE

## 2024-04-14 NOTE — TELEPHONE ENCOUNTER
----- Message from Yamil Goncalves sent at 11/28/2018  9:52 AM CST -----  Contact: Steffi Waldennorbert-Pt's Daughter In law  She is calling in regards to the Consent forms not being signed, please advise [phone]   English